# Patient Record
Sex: MALE | Race: WHITE | NOT HISPANIC OR LATINO | Employment: OTHER | URBAN - METROPOLITAN AREA
[De-identification: names, ages, dates, MRNs, and addresses within clinical notes are randomized per-mention and may not be internally consistent; named-entity substitution may affect disease eponyms.]

---

## 2020-01-22 ENCOUNTER — OFFICE VISIT (OUTPATIENT)
Dept: PODIATRY | Facility: CLINIC | Age: 60
End: 2020-01-22
Payer: COMMERCIAL

## 2020-01-22 VITALS
BODY MASS INDEX: 31.14 KG/M2 | DIASTOLIC BLOOD PRESSURE: 70 MMHG | HEIGHT: 73 IN | WEIGHT: 235 LBS | SYSTOLIC BLOOD PRESSURE: 116 MMHG

## 2020-01-22 DIAGNOSIS — M79.89 SOFT TISSUE MASS: Primary | ICD-10-CM

## 2020-01-22 DIAGNOSIS — M79.671 RIGHT FOOT PAIN: ICD-10-CM

## 2020-01-22 PROCEDURE — 99203 OFFICE O/P NEW LOW 30 MIN: CPT | Performed by: PODIATRIST

## 2020-01-22 ASSESSMENT — MIFFLIN-ST. JEOR: SCORE: 1924.83

## 2020-01-22 NOTE — PATIENT INSTRUCTIONS
Thank you for choosing [facility] Podiat[facility] / Foot & Ankle Surgery!    DR. CAO'S CLINIC LOCATIONS     MONDAY - [place] WEDNESDAY (AM ONLY) - [place]   [address] [place] [address] [address] [address]   [phone] / [phone] / [phone]       THURSDAY - [place] SCHEDULE SURGERY: [phone]   [address] APPOINTMENTS: [phone]   [place] [address] BILLING QUESTIONS: [phone] / [phone]         Follow up: 6 weeks          FYI: BODY WEIGHT AND YOUR FEET  The following information is included in the after visit summary for all patients. Body weight can be a sensitive issue to discuss in clinic, but we think the following information is very important. Although we focus on the feet and ankles, we do support the overall health of our patients.     Many things can cause foot and ankle problems. Foot structure, activity level, foot mechanics and injuries are common causes of pain. One very important issue that often goes unmentioned, is body weight. Extra weight can cause increased stress on muscles, ligaments, bones and tendons. Sometimes just a few extra pounds is all it takes to put one over her/his threshold. Without reducing that stress, it can be difficult to alleviate pain. As Foot & Ankle specialists, our job is addressing the lower extremity problem and possible causes. Regarding extra body weight, we encourage patients to discuss diet and weight management plans with their primary care doctors. It is this team approach that gives you the best opportunity for pain relief and getting you back on your feet.      [facility] has a Comprehensive Weight Management Program. This program includes counseling, education, non-surgical and surgical approaches to weight loss. If you are interested in learning more either talk to you primary care provider or call [phone].

## 2020-01-22 NOTE — PROGRESS NOTES
ASSESSMENT/PLAN:    Encounter Diagnoses   Name Primary?     Soft tissue mass Yes     Right foot pain      Since the lesion causes pain and we don't know what it is, I recommend excision and biopsy.  He said that there was an attempt at aspiration that was not successful. Although this can be multiple things, I suspect an epidermal inclusion cyst.    He will consider this.  For more immediate pain relief, I recommended stiffer soled shoes and considering a cushioned insert with aperture cut below the area. Low impact exercise is best at this time.    We discussed the surgical procedure and expected length of recovery.   The patient was asked to carefully consider surgery.  If it is scheduled, he will need to return for a more detailed discussion including the planned procedure(s), risks, benefits, post operative cares, course and prognosis.      Body mass index is 31 kg/m .    Weight management plan: Patient was referred to their PCP to discuss a diet and exercise plan.      Danie Raza DPM, FACFAS, MS    [facility] of Pod[facility]/Foot & Ankle Surgery      ____________________________________________________________________    HPI:         Chief Complaint: lump on ball of right foot  Onset of problem: 9 months  Pain/ discomfort is described as:  Ache when I step  Ratin/10   Frequency:  daily    The pain is made worse with hiking, walking  Previous treatment: [facility]; MRI was done    Secondary concerns:  Left foot numbness related to a previously diagnosed back problem.  He has seen a specialist for this.     *There is no problem list on file for this patient.  *  *No past surgical history on file.*  *  No current outpatient medications on file.       ROS:     A 10-point review of systems was performed and is positive for that noted above in the HPI and as seen below.  All other areas are negative.     Numbness in feet?  no   Calf pain with walking? no  Recent foot/ankle injury? no  Weight  "change over past 12 months? no  Self perception as overweight? yes  Recent flu-like symptoms? no  Joint pain other than feet ? back    Social History: Employment:  retired;  Exercise/Physical activity:  daily;  Tobacco use:  no  Social History     Socioeconomic History     Marital status:      Spouse name: Not on file     Number of children: Not on file     Years of education: Not on file     Highest education level: Not on file   Occupational History     Not on file   Social Needs     Financial resource strain: Not on file     Food insecurity:     Worry: Not on file     Inability: Not on file     Transportation needs:     Medical: Not on file     Non-medical: Not on file   Tobacco Use     Smoking status: Never Smoker     Smokeless tobacco: Never Used   Substance and Sexual Activity     Alcohol use: Not on file     Drug use: Not on file     Sexual activity: Not on file   Lifestyle     Physical activity:     Days per week: Not on file     Minutes per session: Not on file     Stress: Not on file   Relationships     Social connections:     Talks on phone: Not on file     Gets together: Not on file     Attends Mu-ism service: Not on file     Active member of club or organization: Not on file     Attends meetings of clubs or organizations: Not on file     Relationship status: Not on file     Intimate partner violence:     Fear of current or ex partner: Not on file     Emotionally abused: Not on file     Physically abused: Not on file     Forced sexual activity: Not on file   Other Topics Concern     Not on file   Social History Narrative     Not on file       Family history:  No family history on file.    Rheumatoid arthritis:  no  Foot Problems: no  Diabetes: no      EXAM:    Vitals: /70   Ht 1.854 m (6' 1\")   Wt 106.6 kg (235 lb)   BMI 31.00 kg/m    BMI: Body mass index is 31 kg/m .  Height: 6' 1\"    Constitutional/ general:  Pt is in no apparent distress, appears well-nourished.  Cooperative with " history and physical exam.     Vascular:  Pedal pulses are palpable bilaterally for both the DP and PT arteries.  CFT < 3 sec.  No edema.  Pedal hair growth noted.     Neuro:  Alert and oriented x 3. Coordinated gait.  Light touch sensation is intact to the L4, L5, S1 distributions. No obvious deficits.  No evidence of neurological-based weakness, spasticity, or contracture in the lower extremities.     Derm: Normal texture and turgor.  No erythema, ecchymosis, or cyanosis.  No open lesions. Hyperkeratotic lesion sub right first metatarsal head region with a palpable underlying/ subcutaneous mass.     Musculoskeletal:    Lower extremity muscle strength is normal.  Patient is ambulatory without an assistive device or brace .  No gross deformities.      MRI results found on Care Everywhere:    Suspected proteinaceous/ hemorrhagic adventitial bursa or cyst

## 2020-01-22 NOTE — PROGRESS NOTES
MRI 9/16/19  IMPRESSION:  1.  Nonspecific ovoid 1.6 cm lesion along the plantar surface of the forefoot at  the level of the first MTP joint. There is no definitive enhancement, suggesting  that this lesion is a proteinaceous/hemorrhagic adventitial bursa or cyst.  Infection is difficult to exclude entirely based on the imaging findings.  Correlation is needed. Consider fluid/tissue sampling for more definitive  characterization.    2.  Degenerative changes of the first MTP and third TMT joint.

## 2020-02-26 ENCOUNTER — OFFICE VISIT (OUTPATIENT)
Dept: PEDIATRICS | Facility: CLINIC | Age: 60
End: 2020-02-26
Payer: COMMERCIAL

## 2020-02-26 ENCOUNTER — MYC MEDICAL ADVICE (OUTPATIENT)
Dept: PEDIATRICS | Facility: CLINIC | Age: 60
End: 2020-02-26

## 2020-02-26 ENCOUNTER — TELEPHONE (OUTPATIENT)
Dept: DERMATOLOGY | Facility: CLINIC | Age: 60
End: 2020-02-26

## 2020-02-26 VITALS
WEIGHT: 236.8 LBS | BODY MASS INDEX: 32.07 KG/M2 | RESPIRATION RATE: 16 BRPM | SYSTOLIC BLOOD PRESSURE: 116 MMHG | HEART RATE: 80 BPM | HEIGHT: 72 IN | OXYGEN SATURATION: 98 % | DIASTOLIC BLOOD PRESSURE: 78 MMHG | TEMPERATURE: 97.3 F

## 2020-02-26 DIAGNOSIS — Z12.5 SCREENING FOR PROSTATE CANCER: ICD-10-CM

## 2020-02-26 DIAGNOSIS — L57.0 ACTINIC KERATOSIS: Primary | ICD-10-CM

## 2020-02-26 DIAGNOSIS — Z00.00 ROUTINE GENERAL MEDICAL EXAMINATION AT A HEALTH CARE FACILITY: Primary | ICD-10-CM

## 2020-02-26 DIAGNOSIS — R20.0 NUMBNESS OF LEFT FOOT: ICD-10-CM

## 2020-02-26 DIAGNOSIS — L57.0 ACTINIC KERATOSIS: ICD-10-CM

## 2020-02-26 DIAGNOSIS — M51.27 HERNIATION OF INTERVERTEBRAL DISC BETWEEN L5 AND S1: ICD-10-CM

## 2020-02-26 DIAGNOSIS — Z12.11 SCREEN FOR COLON CANCER: ICD-10-CM

## 2020-02-26 LAB
ANION GAP SERPL CALCULATED.3IONS-SCNC: 3 MMOL/L (ref 3–14)
BUN SERPL-MCNC: 19 MG/DL (ref 7–30)
CALCIUM SERPL-MCNC: 8.6 MG/DL (ref 8.5–10.1)
CHLORIDE SERPL-SCNC: 109 MMOL/L (ref 94–109)
CHOLEST SERPL-MCNC: 172 MG/DL
CO2 SERPL-SCNC: 27 MMOL/L (ref 20–32)
CREAT SERPL-MCNC: 1.18 MG/DL (ref 0.66–1.25)
GFR SERPL CREATININE-BSD FRML MDRD: 66 ML/MIN/{1.73_M2}
GLUCOSE SERPL-MCNC: 99 MG/DL (ref 70–99)
HCV AB SERPL QL IA: NONREACTIVE
HDLC SERPL-MCNC: 62 MG/DL
HIV 1+2 AB+HIV1 P24 AG SERPL QL IA: NONREACTIVE
LDLC SERPL CALC-MCNC: 104 MG/DL
NONHDLC SERPL-MCNC: 110 MG/DL
POTASSIUM SERPL-SCNC: 4.4 MMOL/L (ref 3.4–5.3)
PSA SERPL-ACNC: 1.9 UG/L (ref 0–4)
SODIUM SERPL-SCNC: 139 MMOL/L (ref 133–144)
TRIGL SERPL-MCNC: 32 MG/DL

## 2020-02-26 PROCEDURE — 99386 PREV VISIT NEW AGE 40-64: CPT | Performed by: NURSE PRACTITIONER

## 2020-02-26 PROCEDURE — G0103 PSA SCREENING: HCPCS | Performed by: NURSE PRACTITIONER

## 2020-02-26 PROCEDURE — 86803 HEPATITIS C AB TEST: CPT | Performed by: NURSE PRACTITIONER

## 2020-02-26 PROCEDURE — 80048 BASIC METABOLIC PNL TOTAL CA: CPT | Performed by: NURSE PRACTITIONER

## 2020-02-26 PROCEDURE — 87389 HIV-1 AG W/HIV-1&-2 AB AG IA: CPT | Performed by: NURSE PRACTITIONER

## 2020-02-26 PROCEDURE — 99203 OFFICE O/P NEW LOW 30 MIN: CPT | Mod: 25 | Performed by: NURSE PRACTITIONER

## 2020-02-26 PROCEDURE — 36415 COLL VENOUS BLD VENIPUNCTURE: CPT | Performed by: NURSE PRACTITIONER

## 2020-02-26 PROCEDURE — 80061 LIPID PANEL: CPT | Performed by: NURSE PRACTITIONER

## 2020-02-26 ASSESSMENT — MIFFLIN-ST. JEOR: SCORE: 1917.12

## 2020-02-26 NOTE — TELEPHONE ENCOUNTER
General Call:   Who is calling:  Patient  Reason for Call:  Appointment  What are your questions or concerns:  Patient saw Annalisa Nelson 2/26 and was referred to derm because he has a spot on his right temple. Patient stated he had previously had the spot frozen off by a dermatologist but it has come back. It doesn't look like Dr. Ortiz has any New Pt spots available and her schedule isn't open yet for June. Patient is hoping she can fit him in sometime sooner.  Date of last appointment with provider: 2/26  Okay to leave a detailed message:Yes at Cell number on file:    Telephone Information:   Mobile [phone]        Kirsty Milton,

## 2020-02-26 NOTE — PROGRESS NOTES
"SUBJECTIVE:   CC: Jeremy Barnett is an 59 year old male who presents for preventative health visit.     Healthy Habits:     Getting at least 3 servings of Calcium per day:  Yes    Bi-annual eye exam:  Yes    Dental care twice a year:  Yes    Sleep apnea or symptoms of sleep apnea:  None    Diet:  Regular (no restrictions)    Frequency of exercise:  6-7 days/week    Duration of exercise:  45-60 minutes    Taking medications regularly:  Not Applicable    Barriers to taking medications:  Not applicable    Medication side effects:  Not applicable    PHQ-2 Total Score: 0    Additional concerns today:  Yes        He was previously following with [facility] and unhappy with the care he received there so he changed insurance and is transferring care to [place]  He retired Dec 31, 2018  Longstanding history of low back pain  In October of last year developed numbness of left foot from below knee to toes  Saw spine specialist at [facility]  Had MRI at [place]  \"1. Left central disc extrusion at L5-S1 contacts and displaces the   traversing left S1 nerve root within the lateral recess.  2. Mild degenerative change at L4-L5.\"    [facility] 5-6 [facility] of [facility] which was not effective. He later found out they did it in the wrong place  He continues to have numbness of left food, sometimes laterally, sometimes anteriorly  He continues to do home PHYSICAL THERAPY exercises  Back pain has resolved, but numbness and weakness remains    He had a lesion on face frozen last year by dermatology and it has returned  Told it was \"pre-cancerous\"    Enjoys hiking. Used to hike 5-6 miles every morning and this is difficult given foot weakness/numbness        Today's PHQ-2 Score:   PHQ-2 ( 1999 [employer]) 2/23/2020   Q1: Little interest or pleasure in doing things 0   Q2: Feeling down, depressed or hopeless 0   PHQ-2 Score 0   Q1: Little interest or pleasure in doing things Not at all   Q2: Feeling down, depressed or " hopeless Not at all   PHQ-2 Score 0       Abuse: Current or Past(Physical, Sexual or Emotional)- No  Do you feel safe in your environment? Yes    Have you ever done Advance Care Planning? (For example, a Health Directive, POLST, or a discussion with a medical provider or your loved ones about your wishes): YES-Instructed to bring copy in to get scanned    Social History     Tobacco Use     Smoking status: Never Smoker     Smokeless tobacco: Never Used   Substance Use Topics     Alcohol use: Not on file       No flowsheet data found.    Last PSA: No results found for: PSA    Reviewed orders with patient. Reviewed health maintenance and updated orders accordingly - Yes  Lab work is in process  Labs reviewed in EPIC  BP Readings from Last 3 Encounters:   20 116/78   20 116/70    Wt Readings from Last 3 Encounters:   20 107.4 kg (236 lb 12.8 oz)   20 106.6 kg (235 lb)                  There is no problem list on file for this patient.    History reviewed. No pertinent surgical history.    Social History     Tobacco Use     Smoking status: Never Smoker     Smokeless tobacco: Never Used   Substance Use Topics     Alcohol use: Never     Family History   Problem Relation Age of Onset     Other Cancer Mother         pancreatic cancer  at 86 years old     Other Cancer Sister         Breast cancer - treated at age 62 and recovered     Coronary Artery Disease Father         s/p stent, low EF, not CHF           Reviewed and updated as needed this visit by clinical staff         Reviewed and updated as needed this visit by Provider        History reviewed. No pertinent past medical history.     Tablet not Done    Review of Systems  CONSTITUTIONAL: NEGATIVE for fever, chills, change in weight  INTEGUMENTARY/SKIN: NEGATIVE for worrisome rashes, moles or lesions  EYES: NEGATIVE for vision changes or irritation  ENT: NEGATIVE for ear, mouth and throat problems  RESP: NEGATIVE for significant cough or  SOB  CV: NEGATIVE for chest pain, palpitations or peripheral edema  GI: NEGATIVE for nausea, abdominal pain, heartburn, or change in bowel habits   male: negative for dysuria, hematuria, decreased urinary stream, erectile dysfunction, urethral discharge  MUSCULOSKELETAL: POSITIVE as above  NEURO: POSITIVE as above  PSYCHIATRIC: NEGATIVE for changes in mood or affect    OBJECTIVE:   /78 (BP Location: Right arm, Patient Position: Chair, Cuff Size: Adult Large)   Pulse 80   Temp 97.3  F (36.3  C) (Oral)   Resp 16   Ht 1.829 m (6')   Wt 107.4 kg (236 lb 12.8 oz)   SpO2 98%   BMI 32.12 kg/m      Physical Exam  GENERAL: healthy, alert and no distress  EYES: Eyes grossly normal to inspection, PERRL and conjunctivae and sclerae normal  HENT: ear canals and TM's normal, nose and mouth without ulcers or lesions  NECK: no adenopathy, no asymmetry, masses, or scars and thyroid normal to palpation  RESP: lungs clear to auscultation - no rales, rhonchi or wheezes  CV: regular rate and rhythm, normal S1 S2, no S3 or S4, no murmur, click or rub, no peripheral edema and peripheral pulses strong  ABDOMEN: soft, nontender, no hepatosplenomegaly, no masses and bowel sounds normal  MS: Gait intact, unable to toe walk on left d/t weakness, strength 4/5  SKIN: Scaly lesion above right eye brow consistent with AK  NEURO: Altered sensation left foot  PSYCH: mentation appears normal, affect normal/bright    Diagnostic Test Results:  Labs reviewed in Epic    ASSESSMENT/PLAN:   1. Routine general medical examination at a health care facility  - Hepatitis C Screen Reflex to HCV RNA Quant and Genotype  - Lipid panel reflex to direct LDL Fasting  - Basic metabolic panel  - HIV Antigen Antibody Combo    2. Screen for colon cancer  - GASTROENTEROLOGY ADULT REF PROCEDURE ONLY [facility] [phone]; No Provider Preference    3. Actinic keratosis  - DERMATOLOGY REFERRAL    4. Herniation of intervertebral disc between L5 and  "S1  - Recommend referral to medical spine within [place]. Recommend getting CD of previous imaging to bring to appointemnt  - SPINE CARE REFERRAL    5. Numbness of left foot  - as above  - SPINE CARE REFERRAL    6. Screening for prostate cancer  - PSA, screen    COUNSELING:   Reviewed preventive health counseling, as reflected in patient instructions       Regular exercise       Healthy diet/nutrition       Immunizations    Declined for Shingrix - will check with insurance on cost           Consider Hep C screening for patients born between 1945 and 1965       HIV screeninx in teen years, 1x in adult years, and at intervals if high risk       Colon cancer screening       Prostate cancer screening    Estimated body mass index is 31 kg/m  as calculated from the following:    Height as of 20: 1.854 m (6' 1\").    Weight as of 20: 106.6 kg (235 lb).          reports that he has never smoked. He has never used smokeless tobacco.      Counseling Resources:  ATP IV Guidelines  Pooled Cohorts Equation Calculator  FRAX Risk Assessment  ICSI Preventive Guidelines  Dietary Guidelines for Americans, 2010  USDA's MyPlate  ASA Prophylaxis  Lung CA Screening    ESTRELLA Fisher CNP  [facility] [place]  "

## 2020-02-27 NOTE — RESULT ENCOUNTER NOTE
Jeremy Barnett,    Your lab results have been released to [facility].   Your labs look good. Cholesterol is great! HIV and hepatitis C screening is negative.   Please call the clinic if you have any concerns [phone]    ESTRELLA Fisher [facility]

## 2020-02-28 ENCOUNTER — OFFICE VISIT (OUTPATIENT)
Dept: PALLIATIVE MEDICINE | Facility: CLINIC | Age: 60
End: 2020-02-28
Payer: COMMERCIAL

## 2020-02-28 VITALS — SYSTOLIC BLOOD PRESSURE: 136 MMHG | DIASTOLIC BLOOD PRESSURE: 87 MMHG | OXYGEN SATURATION: 99 % | HEART RATE: 71 BPM

## 2020-02-28 DIAGNOSIS — M54.16 LUMBAR RADICULOPATHY: Primary | ICD-10-CM

## 2020-02-28 PROCEDURE — 99203 OFFICE O/P NEW LOW 30 MIN: CPT | Performed by: PHYSICAL MEDICINE & REHABILITATION

## 2020-02-28 ASSESSMENT — PAIN SCALES - GENERAL: PAINLEVEL: SEVERE PAIN (6)

## 2020-02-28 NOTE — TELEPHONE ENCOUNTER
Called and spoke with patient regarding message. He advised he spoke with Annalisa Nelson and she advised him about [facility] [place]. He has appointment scheduled at the end of March and is requesting not to schedule at this time. Kirsty Simon MA

## 2020-02-28 NOTE — PROGRESS NOTES
[facility] Consultation    Date of visit: 2/28/2020    Primary Care Provider: Dr. Annalisa Nelson    Reason for consultation:    Jeremy Barnett is a 59 year old male who is seen in consultation today at the request of his primary care physician, Annalisa Nelson.     Consultation and Evaluation for: Medical spine evaluation. He is accompanied to clinic by his wife.     Review of [place] Prescription Monitoring Program (): Today I have also reviewed the patient's history of controlled substance use, as provided by [place] licensed pharmacies and prescriber dispensers.     Review of Pain Questionnaire: Please see the [facility] health questionnaire, which the patient completed and reviewed with me in detail.    Review of Electronic Chart: Today I have also reviewed available medical information in the patient's medical record at [facility] ([facility]), including relevant provider notes, laboratory work, and imaging.     Chief Complaint:    Paresthesia and weakness left foot    Pain history:  Jeremy Barnett is a 59 year old male with no significant past medical history who first started having problems with back pain many years ago which would be off and on. In October 2019 he had recurrence of back pain mainly in the left side which improved after 2-3 days. Then he was mowing the lawn and couple days after had recurrence of back pain and also radiation into the left leg. He was seen at [facility] for these issues. He had an MRI which showed a disc extrusion at L5-S1 which contacted and displaced the traversing left S1 nerve root. He tried conservative management with PT 5-6 sessions and continues to do his HEP. Had a left L5-S1 Transforaminal epidural steroid injection at [facility]. States that by the time he had the injection his back and leg pain had resolved. The only symptom that remained was paresthesias in the leg distal to the knee. The  injection was not helpful. Today he reports that he is not having pain. His main concern is ongoing weakness in the foot. He also continues to have paresthesias but this is not what really bothers him. The weakness has been present since the  of  and has not progressed. No recent changes. He notes weakness with lifting the heel off the ground on the left.     Denies red flags including: bowel or bladder symptoms, fever, chills, saddle anesthesia, history of cancer, history of immune compromise, weight loss.     Current medication treatments include:  None    Pain medications are being prescribed by NA.    Previous medication treatments included:  Prednisone     Other treatments have included:  Behavioral interventions: None  PT: Yes - [facility]  Manual Medicine: None  Surgeries: None  Acupuncture: None  TENs Unit: None  Injections: Left L5-S1 Transforaminal epidural steroid injection - [facility]    Past Medical History:  Reviewed and non-contributory    Past Surgical History:  None    Medications:  Current Outpatient Medications   Medication Sig Dispense Refill     cetirizine (ZYRTEC) 10 MG tablet Take 10 mg by mouth daily       Allergies:   No Known Allergies    Social History:  Home situation: Lives in [place], [place] with his wife.   Occupation/Schooling: Retired. Keeps active.   Tobacco use: None  Alcohol use: None  Drug use: None    Family history:  Family History   Problem Relation Age of Onset     Other Cancer Mother         pancreatic cancer  at 86 years old     Other Cancer Sister         Breast cancer - treated at age 62 and recovered     Coronary Artery Disease Father         s/p stent, low EF, not CHF     Diagnostic Tests:  Lumbar MRI completed on 10/30/2019 showed:  FINDINGS:   Alignment is significant for slight levoconvex curvature. Bone marrow   demonstrates mild edema surrounding L5-S1 disc joints and mild fatty   endplate change surrounding the L4-L5 disc joint. Conus medullaris   and cauda equina are  without appreciable abnormality. Paraspinal soft   tissues are unremarkable.     T12-L1: Normal disc height and signal. No herniation. Normal facets.   No spinal canal or neural foraminal stenosis.      L1-L2: Normal disc height and signal. No herniation. Normal facets.   No spinal canal or neural foraminal stenosis.     L2-L3: Normal disc height and signal. No herniation. Normal facets.   No spinal canal or neural foraminal stenosis.      L3-L4: Normal disc height and signal. No herniation. Normal facets.   No spinal canal or neural foraminal stenosis.     L4-L5: Mild disc height loss. Disc bulge, asymmetric to the left.   Mild bilateral facet adenopathy. Mild bilateral foraminal stenosis.   Mild spinal canal stenosis with slight asymmetric narrowing of the   left lateral recess.     L5-S1: Mild disc height loss. Disc bulge with superimposed left   central disc extrusion measuring approximately 0.9 x 1.8 x 2.2 cm (AP   x TR x SI). The extrusion contacts and displaces the traversing left   S1 nerve root within the lateral recess (series 6 image 28, series 3   image 8). Mild bilateral foraminal stenosis, right worse than left.   Mild spinal canal stenosis.     IMPRESSION:  1. Left central disc extrusion at L5-S1 contacts and displaces the   traversing left S1 nerve root within the lateral recess.  2. Mild degenerative change at L4-L5.    Review of Systems:    POSTIVE IN BOLD  GENERAL: fever/chills, fatigue, general unwell feeling, weight gain/loss.  HEAD/EYES:  headache, dizziness, or vision changes.    EARS/NOSE/THROAT:  Nosebleeds, hearing loss, sinus infection, earache, tinnitus.  IMMUNE:  Allergies, cancer, immune deficiency, or infections.  SKIN:  Urticaria, rash, hives  HEME/Lymphatic:   anemia, easy bruising, easy bleeding.  RESPIRATORY:  cough, wheezing, or shortness of breath  CARDIOVASCULAR/Circulation:  Extremity edema, syncope, hypertension, tachycardia, or angina.  GASTROINTESTINAL:  abdominal pain,  nausea/emesis, diarrhea, constipation,  hematochezia, or melena.  ENDOCRINE:  Diabetes, steroid use,  thyroid disease or osteoporosis.  MUSCULOSKELETAL: neck pain, back pain, arthralgia, arthritis, or gout.  GENITOURINARY:  frequency, urgency, dysuria, difficulty voiding, hematuria or incontinence.  NEUROLOGIC:  weakness, numbness, paresthesias, seizure, tremor, stroke or memory loss.  PSYCHIATRIC:  depression, anxiety, stress, suicidal thoughts or mood swings.     Physical Exam:  Vitals:    02/28/20 1414   BP: 136/87   Pulse: 71   SpO2: 99%     Exam:  Constitutional: healthy, alert, active and no distress  Head: normocephalic. Atraumatic.   Eyes: no redness or jaundice noted   ENT: oropharnx normal.  MMM.  Neck supple.    Respiratory: breathing unlabored on room air  Gastrointestinal: soft, non-tender  : deferred  Skin: no suspicious lesions or rashes  Psychiatric: mentation appears normal and affect normal/bright    Musculoskeletal exam:  Gait/Station/Posture: Normal  Thoracic spine:  Normal   Lumbar spine: ROM wnl  Myofascial tenderness:  No significant myofascial tenderness of lumbar paraspinals  Straight leg exam: negative b/l     Bilateral hip motion without discomfort     Neurologic exam:  Motor:  5/5 LE strength, except for left ankle dorsiflexion which is 4/5 with manual muscle testing. He has difficulty with single leg heel raise on the left.    Reflexes:     Patella:  R:  2/4 L: 2/4   Achilles:  R:  2/4 L: 0/4     Sensory:  (lower extremities):   Light touch: normal    Allodynia: absent    Hyperalgesia: absent     Assessment:  Jeremy Barnett is a 59 year old male with no significant past medical history who presents with complaints of paresthesia and weakness in left lower extremity which has been ongoing since October 2019. MRI of lumbar spine shows a disc extrusion at L5-S1 which contacted and displaced the traversing left S1 nerve root. Pain has resolved but he is having ongoing paresthesia and  weakness with left ankle dorsiflexion. Exam is also notable for 0/4 reflex at left achilles and 4/5 strength with ankle dorsiflexion. Etiology of his symptoms is consistent with a left S1 radiculopathy.     Plan:  Diagnosis reviewed, treatment options addressed, and risks/benefits discussed. The patient was involved in shared decision making regarding the plan as laid out below.    1. Neurosurgery referral placed given weakness and reflex changes on exam.   2. Discussed that he can f/u with me if surgery is not recommended to help manage symptoms.     Lise Loyola MD  [facility]      Total time spent face to face was 40 minutes and more than 50% of face to face time was spent in counseling and/or coordination of care regarding the diagnosis and recommendations above.

## 2020-02-28 NOTE — PATIENT INSTRUCTIONS
Thank you for coming to [facility] for your care. It is my goal to partner with you to help you reach your optimal state of health.     You were seen today for your back pain. As discussed during your visit these are the recommendations:    1. Recommend a referral to Neurosurgery.  2. You can follow up with me if no surgery is recommended and we can discuss other options that could be helpful.   ---------------------------------------------------------------  Clinic Number:  [phone]     Call with any questions about your care and for scheduling assistance.     Calls are returned Monday through Friday between 8 AM and 4:30 PM. We usually get back to you within 2 business days depending on the issue/request.    If we are prescribing your medications:    For opioid medication refills, call the clinic or send a [employer] message 7 days in advance.  Please include:    Name of requested medication    Name of the pharmacy.    For non-opioid medications, call your pharmacy directly to request a refill. Please allow 3-4 days to be processed.     Per [place] State Law:    All controlled substance prescriptions must be filled within 30 days of being written.      For those controlled substances allowing refills, pickup must occur within 30 days of last fill.      We believe regular attendance is key to your success in our program!      Any time you are unable to keep your appointment we ask that you call us at least 24 hours in advance to cancel.This will allow us to offer the appointment time to another patient.     Multiple missed appointments may lead to dismissal from the clinic.

## 2020-03-02 ENCOUNTER — OFFICE VISIT (OUTPATIENT)
Dept: NEUROSURGERY | Facility: CLINIC | Age: 60
End: 2020-03-02
Attending: PHYSICIAN ASSISTANT
Payer: COMMERCIAL

## 2020-03-02 VITALS
WEIGHT: 240 LBS | HEIGHT: 72 IN | DIASTOLIC BLOOD PRESSURE: 82 MMHG | HEART RATE: 78 BPM | BODY MASS INDEX: 32.51 KG/M2 | OXYGEN SATURATION: 99 % | RESPIRATION RATE: 18 BRPM | SYSTOLIC BLOOD PRESSURE: 139 MMHG

## 2020-03-02 DIAGNOSIS — M54.16 LUMBAR RADICULOPATHY: ICD-10-CM

## 2020-03-02 PROCEDURE — 99204 OFFICE O/P NEW MOD 45 MIN: CPT | Performed by: PHYSICIAN ASSISTANT

## 2020-03-02 PROCEDURE — G0463 HOSPITAL OUTPT CLINIC VISIT: HCPCS

## 2020-03-02 RX ORDER — CETIRIZINE HYDROCHLORIDE 10 MG/1
10 TABLET ORAL DAILY
Status: ON HOLD | COMMUNITY
End: 2020-03-16

## 2020-03-02 ASSESSMENT — PAIN SCALES - GENERAL: PAINLEVEL: NO PAIN (0)

## 2020-03-02 ASSESSMENT — MIFFLIN-ST. JEOR: SCORE: 1931.63

## 2020-03-02 NOTE — NURSING NOTE
Jeremy Barnett is a 59 year old male who presents for:  Chief Complaint   Patient presents with     Consult For     Lumbar        Initial Vitals:  /82   Pulse 78   Resp 18   Ht 6' (1.829 m)   Wt 240 lb (108.9 kg)   SpO2 99%   BMI 32.55 kg/m   Estimated body mass index is 32.55 kg/m  as calculated from the following:    Height as of this encounter: 6' (1.829 m).    Weight as of this encounter: 240 lb (108.9 kg).. Body surface area is 2.35 meters squared. BP completed using cuff size: regular  No Pain (0)    Nursing Comments: Pt present today for consult of Lumbar.    Vadim Gonsalez, CMA

## 2020-03-02 NOTE — LETTER
3/2/2020         RE: Jeremy Barnett  [address] [place] [address]        Dear Colleague,    Thank you for referring your patient, Jeremy Barnett, to the [facility]. Please see a copy of my visit note below.    NEUROSURGERY CLINIC CONSULT NOTE     DATE OF VISIT: 3/2/2020     SUBJECTIVE:     Jeremy Barnett is a pleasant 59 year old male who presents to the clinic today for consultation on left lower leg and foot numbness and weakness. He is referred to the Neurosurgery Clinic by Dr. Loyola of [facility].   Today, Mr. Barnett reports a 5-month history of symptoms. He describes constant numbness from his left knee to his foot, mainly on the lateral aspect. He is also experiencing left plantar flexion weakness. He is no longer experiencing any pain what-so-ever. No alleviating or agravating symptoms as he is only experiencing constant numbness and weakness. No mechanism of injury such as trauma or a fall is associated with the onset of the pain. There are no bowel or bladder changes. The patient has participated in conservative therapies to include physical therapy, traction, contrast baths and a left L5-S1 TF epidural steroid injection administered on 02/28/2020. None of these modalities have provided any significant long term relief.       Current Outpatient Medications:      cetirizine (ZYRTEC) 10 MG tablet, Take 10 mg by mouth daily, Disp: , Rfl:      No Known Allergies     History reviewed. No pertinent past medical history.     ROS: 10 point review of symptoms are negative other than the symptoms noted above in the HPI.     Family History has been reviewed with the patient, there are no pertinent findings to presenting concern.     History reviewed. No pertinent surgical history.     Social History     Tobacco Use     Smoking status: Never Smoker     Smokeless tobacco: Never Used   Substance Use Topics     Alcohol use: Never     Drug use: Never        OBJECTIVE:   /82   Pulse 78    Resp 18   Ht 6' (1.829 m)   Wt 240 lb (108.9 kg)   SpO2 99%   BMI 32.55 kg/m      Body mass index is 32.55 kg/m .     Imaging:     Lumbar MRI  - [facility]    Findings, per radiologist read, notable for:      L5-S1 HNP     Full radiological report in chart. Imaging reviewed with with patient today.     Exam:     Patient appears comfortable, conversational, and in no apparent distress.   Head: Normocephalic, without obvious abnormality, atraumatic, no facial asymmetry.   Eyes: conjunctivae/corneas clear. PERRL, EOM's intact.   Throat: lips, mucosa, and tongue normal; teeth and gums normal.   Neck: supple, symmetrical, trachea midline, no adenopathy and thyroid: not enlarged, symmetric, no tenderness/mass/nodules.   Lungs: clear to auscultation bilaterally.   Heart: regular rate and rhythm.   Abdomen: soft, non-tender; bowel sounds normal; no masses, no organomegaly.   Pulses: 2+ and symmetric.   Skin: Skin color, texture, turgor normal. No rashes or lesions.     CN II-XII grossly intact, alert and appropriate with conversation and following commands.   Gait is non-antalgic. Able to tandem walk. Unable to walk on left toes without difficulty.    Cervical spine is non tender to palpation. Appropriate range of motion of neck, not concerning for lhermitte's phenomenon.   Bilateral bicep 2/4 and tricep reflexes 1/4. Sensation intact throughout upper extremities.     UE muscle strength  Right  Left    Deltoid  5/5  5/5    Biceps  5/5  5/5    Triceps  5/5  5/5    Hand intrinsics  5/5  5/5    Hand grasp  5/5  5/5    Juan signs  neg  neg      Lumbar spine is non tender to palpation   Intact diminished throughout left lower extremities.   Bilateral patellar 2/4 and achilles reflex 1/4. Negative for pain with straight leg raise.     LE muscle strength  Right  Left    Iliopsoas (hip flexion)  5/5  5/5    Quad (knee extension)  5/5  5/5    Hamstring (knee flexion)  5/5  5/5    Gastrocnemius (PF)  5/5  4/5     Tibialis Ant. (DF)  5/5  5/5    EHL  5/5  5/5      Negative Babinski bilaterally. Negative for clonus.   Calves are soft and non-tender bilaterally.     ASSESSMENT/PLAN:     Jeremy Barnett is a 59 year old male who presents to the clinic for consultation on  left lower leg and foot numbness and weakness, there is no pain. The patient's most recent imaging was reviewed with he and his wife today. It was explained that images show a L5-S1 HNP, which correlates to today's clinical examination. On exam, the patient is noted to have left plantar flexion weakness and diminished sensation from the knee to the foot. The patient has attempted conservative management with physical therapy, traction, contrast baths and a left L5-S1 TF epidural steroid injection, without resolution of symptoms.     He has failed conservative management, and therefore, we feel that he would be best amendable to surgical intervention. We discussed surgical options that included a left L5-S1 hemilaminectomy microdiscectomy. We also discussed continuing with non-surgical options, although the risk of permanent nerve damage is greater with continued delay of relieving the pressure from the nerve due to the stenosis.   We reviewed the surgical risks including nerve damage, infection, bleeding, residual or recurrent disk / symptoms and spinal fluid leak. This will also be performed utilizing general anesthesia which can pose both cardiovascular and respiratory risks as well.     We described the procedure as being a minimally invasive same day surgery to be performed at [facility]. We also discussed the normal postoperative recovery that would include not lifting anything greater than 5-10 pounds, avoid excessive bending, twisting, and turning and to make frequent position changes about every 30 minutes going from sitting, standing and walking for six weeks approximately. We also discussed the timing of post-operative follow-up appointments in  the Neurosurgery Clinic.     We did review the images together and explained his condition and the recommended treatment. We also discussed signs of a worsening problem that he should seek being evaluated.   He appeared to have a good understanding of the situation, asked appropriate questions which we answered, and wishes to discuss this with his wife prior to proceeding with surgical intervention.     He will call the clinic with any decision he has made. Our cards with contact information  were provided  In the event that patient's symptoms worsen or change we would like to see him sooner. We also discussed signs of a worsening problem that he should seek being evaluated.        Respectfully,     ANGE Galicia PA-C Dr. Hunt met with the patient today and reviewed the above plan.       Again, thank you for allowing me to participate in the care of your patient.        Sincerely,        Reji Sharma PA-C

## 2020-03-02 NOTE — PROGRESS NOTES
NEUROSURGERY CLINIC CONSULT NOTE     DATE OF VISIT: 3/2/2020     SUBJECTIVE:     Jeremy Barnett is a pleasant 59 year old male who presents to the clinic today for consultation on left lower leg and foot numbness and weakness. He is referred to the Neurosurgery Clinic by Dr. Loyola of [facility].   Today, Mr. Barnett reports a 5-month history of symptoms. He describes constant numbness from his left knee to his foot, mainly on the lateral aspect. He is also experiencing left plantar flexion weakness. He is no longer experiencing any pain what-so-ever. No alleviating or agravating symptoms as he is only experiencing constant numbness and weakness. No mechanism of injury such as trauma or a fall is associated with the onset of the pain. There are no bowel or bladder changes. The patient has participated in conservative therapies to include physical therapy, traction, contrast baths and a left L5-S1 TF epidural steroid injection administered on 02/28/2020. None of these modalities have provided any significant long term relief.       Current Outpatient Medications:      cetirizine (ZYRTEC) 10 MG tablet, Take 10 mg by mouth daily, Disp: , Rfl:      No Known Allergies     History reviewed. No pertinent past medical history.     ROS: 10 point review of symptoms are negative other than the symptoms noted above in the HPI.     Family History has been reviewed with the patient, there are no pertinent findings to presenting concern.     History reviewed. No pertinent surgical history.     Social History     Tobacco Use     Smoking status: Never Smoker     Smokeless tobacco: Never Used   Substance Use Topics     Alcohol use: Never     Drug use: Never        OBJECTIVE:   /82   Pulse 78   Resp 18   Ht 6' (1.829 m)   Wt 240 lb (108.9 kg)   SpO2 99%   BMI 32.55 kg/m     Body mass index is 32.55 kg/m .     Imaging:     Lumbar MRI WO - [facility]    Findings, per radiologist read, notable for:      L5-S1 HNP     Full  radiological report in chart. Imaging reviewed with with patient today.     Exam:     Patient appears comfortable, conversational, and in no apparent distress.   Head: Normocephalic, without obvious abnormality, atraumatic, no facial asymmetry.   Eyes: conjunctivae/corneas clear. PERRL, EOM's intact.   Throat: lips, mucosa, and tongue normal; teeth and gums normal.   Neck: supple, symmetrical, trachea midline, no adenopathy and thyroid: not enlarged, symmetric, no tenderness/mass/nodules.   Lungs: clear to auscultation bilaterally.   Heart: regular rate and rhythm.   Abdomen: soft, non-tender; bowel sounds normal; no masses, no organomegaly.   Pulses: 2+ and symmetric.   Skin: Skin color, texture, turgor normal. No rashes or lesions.     CN II-XII grossly intact, alert and appropriate with conversation and following commands.   Gait is non-antalgic. Able to tandem walk. Unable to walk on left toes without difficulty.    Cervical spine is non tender to palpation. Appropriate range of motion of neck, not concerning for lhermitte's phenomenon.   Bilateral bicep 2/4 and tricep reflexes 1/4. Sensation intact throughout upper extremities.     UE muscle strength  Right  Left    Deltoid  5/5  5/5    Biceps  5/5  5/5    Triceps  5/5  5/5    Hand intrinsics  5/5  5/5    Hand grasp  5/5  5/5    Juan signs  neg  neg      Lumbar spine is non tender to palpation   Intact diminished throughout left lower extremities.   Bilateral patellar 2/4 and achilles reflex 1/4. Negative for pain with straight leg raise.     LE muscle strength  Right  Left    Iliopsoas (hip flexion)  5/5  5/5    Quad (knee extension)  5/5  5/5    Hamstring (knee flexion)  5/5  5/5    Gastrocnemius (PF)  5/5  4/5    Tibialis Ant. (DF)  5/5  5/5    EHL  5/5  5/5      Negative Babinski bilaterally. Negative for clonus.   Calves are soft and non-tender bilaterally.     ASSESSMENT/PLAN:     Jeremy Barnett is a 59 year old male who presents to the clinic for  consultation on  left lower leg and foot numbness and weakness, there is no pain. The patient's most recent imaging was reviewed with he and his wife today. It was explained that images show a L5-S1 HNP, which correlates to today's clinical examination. On exam, the patient is noted to have left plantar flexion weakness and diminished sensation from the knee to the foot. The patient has attempted conservative management with physical therapy, traction, contrast baths and a left L5-S1 TF epidural steroid injection, without resolution of symptoms.     He has failed conservative management, and therefore, we feel that he would be best amendable to surgical intervention. We discussed surgical options that included a left L5-S1 hemilaminectomy microdiscectomy. We also discussed continuing with non-surgical options, although the risk of permanent nerve damage is greater with continued delay of relieving the pressure from the nerve due to the stenosis.   We reviewed the surgical risks including nerve damage, infection, bleeding, residual or recurrent disk / symptoms and spinal fluid leak. This will also be performed utilizing general anesthesia which can pose both cardiovascular and respiratory risks as well.     We described the procedure as being a minimally invasive same day surgery to be performed at [facility]. We also discussed the normal postoperative recovery that would include not lifting anything greater than 5-10 pounds, avoid excessive bending, twisting, and turning and to make frequent position changes about every 30 minutes going from sitting, standing and walking for six weeks approximately. We also discussed the timing of post-operative follow-up appointments in the Neurosurgery Clinic.     We did review the images together and explained his condition and the recommended treatment. We also discussed signs of a worsening problem that he should seek being evaluated.   He appeared to have a good  understanding of the situation, asked appropriate questions which we answered, and wishes to discuss this with his wife prior to proceeding with surgical intervention.     He will call the clinic with any decision he has made. Our cards with contact information  were provided  In the event that patient's symptoms worsen or change we would like to see him sooner. We also discussed signs of a worsening problem that he should seek being evaluated.        Respectfully,     ANGE Galicia, ABRAHAM Estrada met with the patient today and reviewed the above plan.

## 2020-03-09 ENCOUNTER — PREP FOR PROCEDURE (OUTPATIENT)
Dept: NEUROLOGY | Facility: CLINIC | Age: 60
End: 2020-03-09

## 2020-03-09 DIAGNOSIS — M51.16 HERNIATION OF LUMBAR INTERVERTEBRAL DISC WITH RADICULOPATHY: Primary | ICD-10-CM

## 2020-03-11 PROBLEM — M51.16 HERNIATION OF LUMBAR INTERVERTEBRAL DISC WITH RADICULOPATHY: Status: ACTIVE | Noted: 2020-03-11

## 2020-03-12 ENCOUNTER — MYC MEDICAL ADVICE (OUTPATIENT)
Dept: PODIATRY | Facility: CLINIC | Age: 60
End: 2020-03-12

## 2020-03-16 ENCOUNTER — HOSPITAL ENCOUNTER (OUTPATIENT)
Facility: CLINIC | Age: 60
Discharge: HOME | End: 2020-03-16
Attending: INTERNAL MEDICINE | Admitting: INTERNAL MEDICINE
Payer: COMMERCIAL

## 2020-03-16 VITALS
HEART RATE: 73 BPM | OXYGEN SATURATION: 97 % | RESPIRATION RATE: 10 BRPM | SYSTOLIC BLOOD PRESSURE: 123 MMHG | DIASTOLIC BLOOD PRESSURE: 81 MMHG

## 2020-03-16 LAB — COLONOSCOPY: NORMAL

## 2020-03-16 PROCEDURE — 88305 TISSUE EXAM BY PATHOLOGIST: CPT | Performed by: INTERNAL MEDICINE

## 2020-03-16 PROCEDURE — 25000128 H RX IP 250 OP 636: Performed by: INTERNAL MEDICINE

## 2020-03-16 PROCEDURE — 88305 TISSUE EXAM BY PATHOLOGIST: CPT | Mod: 26 | Performed by: INTERNAL MEDICINE

## 2020-03-16 PROCEDURE — G0500 MOD SEDAT ENDO SERVICE >5YRS: HCPCS | Performed by: INTERNAL MEDICINE

## 2020-03-16 PROCEDURE — 45385 COLONOSCOPY W/LESION REMOVAL: CPT | Performed by: INTERNAL MEDICINE

## 2020-03-16 RX ORDER — ONDANSETRON 2 MG/ML
4 INJECTION INTRAMUSCULAR; INTRAVENOUS
Status: DISCONTINUED | OUTPATIENT
Start: 2020-03-16 | End: 2020-03-16 | Stop reason: HOSPADM

## 2020-03-16 RX ORDER — LIDOCAINE 40 MG/G
CREAM TOPICAL
Status: DISCONTINUED | OUTPATIENT
Start: 2020-03-16 | End: 2020-03-16 | Stop reason: HOSPADM

## 2020-03-16 RX ORDER — FENTANYL CITRATE 50 UG/ML
INJECTION, SOLUTION INTRAMUSCULAR; INTRAVENOUS PRN
Status: DISCONTINUED | OUTPATIENT
Start: 2020-03-16 | End: 2020-03-16 | Stop reason: HOSPADM

## 2020-03-16 NOTE — LETTER
February 27, 2020      Jeremy Barnett  [address] [place] [address]        Dear Jeremy,   Please be aware that coverage of these services is subject to the terms and limitations of your health insurance plan.  Call member services at your health plan with any benefit or coverage questions.    Thank you for choosing [facility]. You are scheduled for the following service(s):    Date:  3/16/20             Procedure:  COLONOSCOPY  Doctor:        Dr. Pérez   Arrival Time:  1010  *Check in at Emergency/Endoscopy desk*  Procedure Time:  1040      Location:   [facility]        Endoscopy Department, First Floor (Enter through ER Doors) *        [address] [place], [place] [address]      [phone] or [phone] ([facility]) to reschedule      MIRALAX -GATORADE  PREP  Colonoscopy is the most accurate test to detect colon polyps and colon cancer; and the only test where polyps can be removed. During this procedure, a doctor examines the lining of your large intestine and rectum through a flexible tube.   Transportation  You must arrange for a ride for the day of your procedure with a responsible adult. A taxi , Uber, etc, is not an option unless you are accompanied by a responsible adult. If you fail to arrange transportation with a responsible adult, your procedure will be cancelled and rescheduled.    Purchase the  following supplies at your local pharmacy:  - 2 (two) bisacodyl tablets: each tablet contains 5 mg.  (Dulcolax  laxative NOT Dulcolax  stool softener)   - 1 (one) 8.3 oz bottle of Polyethylene Glycol (PEG) 3350 Powder   (MiraLAX , Smooth LAX , ClearLAX  or equivalent)  - 64 oz Gatorade    Regular Gatorade, Gatorade G2 , Powerade , Powerade Zero  or Pedialyte  is acceptable. Red colored flavors are not allowed; all other colors (yellow, green, orange, purple and blue) are okay. It is also okay to buy two 2.12 oz packets of powdered Gatorade that  can be mixed with water to a total volume of 64 oz of liquid.  - 1 (one) 10 oz bottle of Magnesium Citrate (Red colored flavors are not allowed)  It is also okay for you to use a 0.5 oz package of powdered magnesium citrate (17 g) mixed with 10 oz of water.      PREPARATION FOR COLONOSCOPY    7 days before:    Discontinue fiber supplements and medications containing iron. This includes Metamucil  and Fibercon ; and multivitamins with iron.    3 days before:    Begin a low-fiber diet. A low-fiber diet helps making the cleanout more effective.     Examples of a low-fiber diet include (but are not limited to): white bread, white rice, pasta, crackers, fish, chicken, eggs, ground beef, creamy peanut butter, cooked/steamed/boiled vegetables, canned fruit, bananas, melons, milk, plain yogurt cheese, salad dressing and other condiments.     The following are not allowed on a low-fiber diet: seeds, nuts, popcorn, bran, whole wheat, corn, quinoa, raw fruits and vegetables, berries and dried fruit, beans and lentils.    For additional details on low-fiber diet, please refer to the table on the last page.    2 days before:    Continue the low-fiber diet.     Drink at least 8 glasses of water throughout the day.     Stop eating solid foods at 11:45 pm.    1 day before:    In the morning: begin a clear liquid diet (liquids you can see through).     Examples of a clear liquid diet include: water, clear broth or bouillon, Gatorade, Pedialyte or Powerade, carbonated and non-carbonated soft drinks (Sprite , 7-Up , ginger ale), strained fruit juices without pulp (apple, white grape, white cranberry), Jell-O  and popsicles.     The following are not allowed on a clear liquid diet: red liquids, alcoholic beverages, dairy products (milk, creamer, and yogurt), protein shakes, creamy broths, juice with pulp and chewing tobacco.    At noon: take 2 (two) bisacodyl tablets     At 4 (and no later than 6pm): start drinking the Miralax-Gatorade  preparation (8.3 oz of Miralax mixed with 64 oz of Gatorade in a large pitcher). Drink 1(one) 8 oz glass every 15 minutes thereafter, until the mixture is gone.    COLON CLEANSING TIPS: drink adequate amounts of fluids before and after your colon cleansing to prevent dehydration. Stay near a toilet because you will have diarrhea. Even if you are sitting on the toilet, continue to drink the cleansing solution every 15 minutes. If you feel nauseous or vomit, rinse your mouth with water, take a 15 to 30-minute-break and then continue drinking the solution. You will be uncomfortable until the stool has flushed from your colon (in about 2 to 4 hours). You may feel chilled.    Day of your procedure  You may take all of your morning medications including blood pressure medications, blood thinners (if you have not been instructed to stop these by our office), methadone, anti-seizure medications with sips of water 3 hours prior to your procedure or earlier. Do not take insulin or vitamins prior to your procedure. Continue the clear liquid diet.       4 hours prior: drink 10 oz of magnesium citrate. It may be easier to drink it with a straw.    STOP consuming all liquids after that.     Do not take anything by mouth during this time.     Allow extra time to travel to your procedure as you may need to stop and use a restroom along the way.    You are ready for the procedure, if you followed all instructions and your stool is no longer formed, but clear or yellow liquid. If you are unsure whether your colon is clean, please call our office at [phone] before you leave for your appointment.    Bring the following to your procedure:  - Insurance Card/Photo ID.   - List of current medications including over-the-counter medications and supplements.   - Your rescue inhaler if you currently use one to control asthma.    Canceling or rescheduling your appointment:   If you must cancel or reschedule your appointment, please call  [phone] as soon as possible.      COLONOSCOPY PRE-PROCEDURE CHECKLIST    If you have diabetes, ask your regular doctor for diet and medication restrictions.  If you take an anticoagulant or anti-platelet medication (such as Coumadin , Lovenox , Pradaxa , Xarelto , Eliquis , etc.), please call your primary doctor for advice on holding this medication.  If you take aspirin you may continue to do so.  If you are or may be pregnant, please discuss the risks and benefits of this procedure with your doctor.        What happens during a colonoscopy?    Plan to spend up to two hours, starting at registration time, at the endoscopy center the day of your procedure. The colonoscopy takes an average of 15 to 30 minutes. Recovery time is about 30 minutes.      Before the exam:    You will change into a gown.    Your medical history and medication list will be reviewed with you, unless that has been done over the phone prior to the procedure.     A nurse will insert an intravenous (IV) line into your hand or arm.    The doctor will meet with you and will give you a consent form to sign.  During the exam:     Medicine will be given through the IV line to help you relax.     Your heart rate and oxygen levels will be monitored. If your blood pressure is low, you may be given fluids through the IV line.     The doctor will insert a flexible hollow tube, called a colonoscope, into your rectum. The scope will be advanced slowly through the large intestine (colon).    You may have a feeling of fullness or pressure.     If an abnormal tissue or a polyp is found, the doctor may remove it through the endoscope for closer examination, or biopsy. Tissue removal is painless    After the exam:           Any tissue samples removed during the exam will be sent to a lab for evaluation. It may take 5-7 working days for you to be notified of the results.     A nurse will provide you with complete discharge instructions before you leave the  endoscopy center. Be sure to ask the nurse for specific instructions if you take blood thinners such as Aspirin, Coumadin or Plavix.     The doctor will prepare a full report for you and for the physician who referred you for the procedure.     Your doctor will talk with you about the initial results of your exam.      Medication given during the exam will prohibit you from driving for the rest of the day.     Following the exam, you may resume your normal diet. Your first meal should be light, no greasy foods. Avoid alcohol until the next day.     You may resume your regular activities the day after the procedure.         LOW-FIBER DIET    Foods RECOMMENDED Foods to AVOID   Breads, Cereal, Rice and Pasta:   White bread, rolls, biscuits, croissant and josephine toast.   Waffles, Estonian toast and pancakes.   White rice, noodles, pasta, macaroni and peeled cooked potatoes.   Plain crackers and saltines.   Cooked cereals: farina, cream of rice.   Cold cereals: Puffed Rice , Rice Krispies , Corn Flakes  and Special K    Breads, Cereal, Rice and Pasta:   Breads or rolls with nuts, seeds or fruit.   Whole wheat, pumpernickel, rye breads and cornbread.   Potatoes with skin, brown or wild rice, and kasha (buckwheat).     Vegetables:   Tender cooked and canned vegetables without seeds: carrots, asparagus tips, green or wax beans, pumpkin, spinach, lima beans. Vegetables:   Raw or steamed vegetables.   Vegetables with seeds.   Sauerkraut.   Winter squash, peas, broccoli, Brussel sprouts, cabbage, onions, cauliflower, baked beans, peas and corn.   Fruits:   Strained fruit juice.   Canned fruit, except pineapple.   Ripe bananas and melon. Fruits:   Prunes and prune juice.   Raw fruits.   Dried fruits: figs, dates and raisins.   Milk/Dairy:   Milk: plain or flavored.   Yogurt, custard and ice cream.   Cheese and cottage cheese Milk/Dairy:     Meat and other proteins:   ground, well-cooked tender beef, lamb, ham, veal, pork, fish,  poultry and organ meats.   Eggs.   Peanut butter without nuts. Meat and other proteins:   Tough, fibrous meats with gristle.   Dry beans, peas and lentils.   Peanut butter with nuts.   Tofu.   Fats, Snack, Sweets, Condiments and Beverages:   Margarine, butter, oils, mayonnaise, sour cream and salad dressing, plain gravy.   Sugar, hard candy, clear jelly, honey and syrup.   Spices, cooked herbs, bouillon, broth and soups made with allowed vegetable, ketchup and mustard.   Coffee, tea and carbonated drinks.   Plain cakes, cookies and pretzels.   Gelatin, plain puddings, custard, ice cream, sherbet and popsicles. Fats, Snack, Sweets, Condiments and Beverages:   Nuts, seeds and coconut.   Jam, marmalade and preserves.   Pickles, olives, relish and horseradish.   All desserts containing nuts, seeds, dried fruit and coconut; or made from whole grains or bran.   Candy made with nuts or seeds.   Popcorn.           DIRECTIONS TO THE ENDOSCOPY DEPARTMENT     From the north ([facility])  Take [address] South, exit on [address] [address]. Get into the left hand ricci, turn left (east), go one-half mile to [address] and turn left. Go north to the first stoplight, take a right on [address] and follow it to the Emergency entrance.    From the south ([facility])  Take [address]N to the [address]E split and exit on [address] [address]. On [address] [address], turn left (west) to [address]. Turn right (north) on [address]. Go north to the first stoplight, take a right on [address] and follow it to the Emergency entrance.    From the east via 35E ([facility])  Take [address]E south to [address] [address] exit. Turn right on [address] [address]. Go west to [address]. Turn right (north) on [address]. Go to the first stoplight, take a right and follow on [address] to the Emergency entrance.    From the east via [address] ([facility])  Take [address] to [address]. Turn left (south) on  [address] to [address]. Turn left (east) on [address] and follow it to the Emergency entrance.    From the west via [address] ([place])  Take [address] east to [address] [place] [address]. Turn right (south) on [address] to [address]. Turn left (east) on [address] and follow it to the Emergency entrance.

## 2020-03-16 NOTE — DISCHARGE INSTRUCTIONS
Understanding Colon and Rectal Polyps     The colon has a smooth lining composed of millions of cells.     The colon (also called the large intestine) is a muscular tube that forms the last part of the digestive tract. It absorbs water and stores food waste. The colon is about 4 to 6 feet long. The rectum is the last 6 inches of the colon. The colon and rectum have a smooth lining composed of millions of cells. Changes in these cells can lead to growths in the colon that can become cancerous and should be removed.     When the Colon Lining Changes  Changes that occur in the cells that line the colon or rectum can lead to growths called polyps. Over a period of years, polyps can turn cancerous. Removing polyps early may prevent cancer from ever forming.      Polyps  Polyps are fleshy clumps of tissue that form on the lining of the colon or rectum. Small polyps are usually benign (not cancerous). However, over time, cells in a polyp can change and become cancerous. The larger a polyp grows, the more likely this is to happen. Also, certain types of polyps known as adenomatous polyps are considered premalignant. This means that they will almost always become cancerous if they re not removed.          Cancer  Almost all colorectal cancers start when polyp cells begin growing abnormally. As a cancerous tumor grows, it may involve more and more of the colon or rectum. In time, cancer can also grow beyond the colon or rectum and spread to nearby organs or to glands called lymph nodes. The cells can also travel to other parts of the body. This is known as metastasis. The earlier a cancerous tumor is removed, the better the chance of preventing its spread.        6258-2275 [facility], [address] [place] [address], [place] [address]. All rights reserved. This information is not intended as a substitute for professional medical care. Always follow your healthcare professional's instructions.

## 2020-03-17 LAB — COPATH REPORT: NORMAL

## 2020-03-31 ENCOUNTER — TELEPHONE (OUTPATIENT)
Dept: NEUROSURGERY | Facility: CLINIC | Age: 60
End: 2020-03-31

## 2020-03-31 DIAGNOSIS — M54.16 LUMBAR RADICULOPATHY: Primary | ICD-10-CM

## 2020-03-31 NOTE — TELEPHONE ENCOUNTER
Returned call to patient, he is requesting any alteratives to surgery. He is also concerned about the possibility of permanent nerve damage to his left leg, if surgery is delayed. Informed patient that would send message to care team to review.

## 2020-03-31 NOTE — TELEPHONE ENCOUNTER
Reason For Call: Called patient to let him know that his surgery would be cancelled until further notice, he is concerned that without the surgery he will have a permanent limp, he would like a call back to discuss this as soon as possible, he can be reached at [phone].

## 2020-04-01 NOTE — TELEPHONE ENCOUNTER
Patient returned call reviewed below message per Reji Sharma PA-C. Patient would like to try lumbar traction, will place order for PT to include traction.    Patient also wishes for Dr. Estrada to review to weigh the possibility of permanent nerve damage by delaying the surgery. Will forward message for Dr. Estrada to review when he returns on 4/3.

## 2020-04-02 NOTE — TELEPHONE ENCOUNTER
Patient is calling requesting a call back from Felix again. Please call the patient back @ [phone]

## 2020-04-02 NOTE — TELEPHONE ENCOUNTER
Returned call to patient the soonest [facility] is able to see patient for eval and traction is 1 month from now. Patient is going to call around to other facilities to see if they can accommodate an appointment sooner. He will return call to clinic if he can get in sooner to another facility and we can fax a new order.

## 2020-04-02 NOTE — TELEPHONE ENCOUNTER
Patient is requesting a call from Felix regarding their conversation yesterday. Please call the patient at [phone]

## 2020-04-02 NOTE — TELEPHONE ENCOUNTER
Patient returned call stating that [facility] is able to see him for PT/lumbar traction on 4/6/20. New order placed and faxed to [facility] @ [phone].

## 2020-04-09 ENCOUNTER — TRANSFERRED RECORDS (OUTPATIENT)
Dept: HEALTH INFORMATION MANAGEMENT | Facility: CLINIC | Age: 60
End: 2020-04-09

## 2020-04-13 ENCOUNTER — TELEPHONE (OUTPATIENT)
Dept: PHYSICAL THERAPY | Facility: CLINIC | Age: 60
End: 2020-04-13

## 2020-04-14 ENCOUNTER — TELEPHONE (OUTPATIENT)
Dept: NEUROSURGERY | Facility: CLINIC | Age: 60
End: 2020-04-14

## 2020-04-14 NOTE — TELEPHONE ENCOUNTER
Called patient at the request of Saturnino Sharma PA-C to offer patient an appointment to discuss surgical options with Dr. Estrada. Per Saturnino ROSARIO the manager of [facility] reached out to him regarding patient deciding to go to [facility] for lumbar traction as recommended at telephone encounter on 3/31/20. Per Saturnino ROSARIO patient may be having an increase in symptoms.    Called patient on 4/13 to discuss, patient states that his symptoms haven't changed (left leg weakness/limp), he has been doing PT since November. He has recently started a new PT regimen with [facility] symptoms have remained the same since LOV on 3/2/20. Patient is asking if he needs to talk about surgery again since nothing has changed.    Spoke with Dr. Estrada, who is still recommending rescheduling surgery post COVID-19. If patient has any specific question or concerns, would be happy to discuss over telephone visit.     Left detailed message with patient explaining the above per Dr. Estrada, asked patient to return call with any questions or concerns.

## 2020-04-14 NOTE — TELEPHONE ENCOUNTER
Spoke with patient he is okay with continuing with physical therapy through [facility] and rescheduling surgery post COVID-19. Patient instructed to continue monitor symptoms and if any changes or red flag symptoms occur to contact us immediately. Patient verbalized understanding.

## 2020-05-04 ENCOUNTER — TELEPHONE (OUTPATIENT)
Dept: NEUROSURGERY | Facility: CLINIC | Age: 60
End: 2020-05-04

## 2020-05-04 NOTE — PATIENT INSTRUCTIONS
Patient Instructions  Pre-Operative:    -Surgery scheduled at [facility] for Left L5-S1 Microdiscectomy, with Dr. Estrada   -Surgical risks: blood clots in the leg or lung, problems urinating, nerve damage, drainage from the incision, infection,stiffness  - Pre-operative physical with primary care physician within 30 days of surgical date.   - Likely same day procedure with discharge home day of surgery, may stay for 23 hour observation hospitalization for monitoring    -Shower procedure: please shower with antimicrobial soap the night before surgery and morning of surgery. Please refer to showering instruction sheet in folder.  -Stop all solid foods 8 hours before surgery.  -Keep drinking clear liquids until 4 hours before surgery. Clear liquids include water, clear juice, black coffee, or clear tea without milk, Gatorade, clear soda.   - Discontinue Aspirin, NSAIDs (Advil/Ibuprofen, Naproxen,Nuprin,Relafen/Nabumetone, Diclofenac,Meloxicam, Aleve, Celebrex) x 7 days prior to surgical date.    - May try tylenol for pain 1000 mg three times per day for pain      Post-Operative:  - you may resume taking NSAIDs immediately post op   - Post operative incisional pain which will require pain medications and muscle relaxants. You will receive medication upon discharge.  -Do NOT drive while taking narcotic pain medication.  -Do not drink alcohol while using any pain medication  -Post operative incision care- Watch for signs of infection: redness, swelling, warmth, drainage, and fever of 101 degrees or higher. Notify clinic [phone].  -No submerging incision in water such as pools, hot tubs,baths for at least 8 weeks or until incision is healed. Showers are fine.   - Post operative activity limitations: 6-8 weeks, no lifting > 10 pounds, limited bending, twisting, or overhead reaching.  -Ok to walk as tolerated, avoid bed rest and prolonged sitting.  -No contact sports until after follow up visit  -No high  impact activities such as; running/jogging, snowmobile or 4 fried riding or any other recreational vehicles.  - Post op follow up appointments: 2 week incision check with RN, 6 week post op follow up visit with Nurse practitioner or Physician Assistant, 3 months with Dr. Estrada. Please call to schedule follow up appointment at [phone].  -If you are currently employed, you will need to be off work for 2-4 weeks for post op recovery and healing. Please fax any FMLA/short term disability paperwork to [phone]. You may call our clinic when you'd like to return to work and we can provide a work letter.

## 2020-05-04 NOTE — TELEPHONE ENCOUNTER
Returned call to patient and went over below instruction and education. Patient would like to  folder and soap from the  in [place].  Patient Instructions  Pre-Operative:    -Surgery scheduled at [facility] for Left L5-S1 Microdiscectomy, with Dr. Estrada   -Surgical risks: blood clots in the leg or lung, problems urinating, nerve damage, drainage from the incision, infection,stiffness  - Pre-operative physical with primary care physician within 30 days of surgical date.   - Likely same day procedure with discharge home day of surgery, may stay for 23 hour observation hospitalization for monitoring    -Shower procedure: please shower with antimicrobial soap the night before surgery and morning of surgery. Please refer to showering instruction sheet in folder.  -Stop all solid foods 8 hours before surgery.  -Keep drinking clear liquids until 4 hours before surgery. Clear liquids include water, clear juice, black coffee, or clear tea without milk, Gatorade, clear soda.   - Discontinue Aspirin, NSAIDs (Advil/Ibuprofen, Naproxen,Nuprin,Relafen/Nabumetone, Diclofenac,Meloxicam, Aleve, Celebrex) x 7 days prior to surgical date.    - May try tylenol for pain 1000 mg three times per day for pain      Post-Operative:  - you may resume taking NSAIDs immediately post op   - Post operative incisional pain which will require pain medications and muscle relaxants. You will receive medication upon discharge.  -Do NOT drive while taking narcotic pain medication.  -Do not drink alcohol while using any pain medication  -Post operative incision care- Watch for signs of infection: redness, swelling, warmth, drainage, and fever of 101 degrees or higher. Notify clinic [phone].  -No submerging incision in water such as pools, hot tubs,baths for at least 8 weeks or until incision is healed. Showers are fine.   - Post operative activity limitations: 6-8 weeks, no lifting > 10 pounds, limited bending,  twisting, or overhead reaching.  -Ok to walk as tolerated, avoid bed rest and prolonged sitting.  -No contact sports until after follow up visit  -No high impact activities such as; running/jogging, snowmobile or 4 fried riding or any other recreational vehicles.  - Post op follow up appointments: 2 week incision check with RN, 6 week post op follow up visit with Nurse practitioner or Physician Assistant, 3 months with Dr. Estrada. Please call to schedule follow up appointment at [phone].  -If you are currently employed, you will need to be off work for 2-4 weeks for post op recovery and healing. Please fax any FMLA/short term disability paperwork to [phone]. You may call our clinic when you'd like to return to work and we can provide a work letter.

## 2020-05-04 NOTE — TELEPHONE ENCOUNTER
Patient will be having surgery 5/13 with  and has some questions regarding surgery and recovery time. Please call the patient back @ [phone]

## 2020-05-05 ENCOUNTER — OFFICE VISIT (OUTPATIENT)
Dept: PEDIATRICS | Facility: CLINIC | Age: 60
End: 2020-05-05
Payer: COMMERCIAL

## 2020-05-05 VITALS
HEIGHT: 72 IN | BODY MASS INDEX: 32.7 KG/M2 | TEMPERATURE: 97.1 F | HEART RATE: 76 BPM | OXYGEN SATURATION: 99 % | SYSTOLIC BLOOD PRESSURE: 138 MMHG | WEIGHT: 241.4 LBS | DIASTOLIC BLOOD PRESSURE: 80 MMHG

## 2020-05-05 DIAGNOSIS — M51.16 HERNIATION OF LUMBAR INTERVERTEBRAL DISC WITH RADICULOPATHY: ICD-10-CM

## 2020-05-05 DIAGNOSIS — Z01.818 PREOP GENERAL PHYSICAL EXAM: Primary | ICD-10-CM

## 2020-05-05 PROCEDURE — 93000 ELECTROCARDIOGRAM COMPLETE: CPT | Performed by: NURSE PRACTITIONER

## 2020-05-05 PROCEDURE — 99214 OFFICE O/P EST MOD 30 MIN: CPT | Performed by: NURSE PRACTITIONER

## 2020-05-05 ASSESSMENT — MIFFLIN-ST. JEOR: SCORE: 1937.98

## 2020-05-05 NOTE — PROGRESS NOTES
[facility] [place]  [address]  [address]  [place] [address]  [phone]  Dept: [phone]    PRE-OP EVALUATION:  Today's date: 2020    Jeremy Barnett (: 1960) presents for pre-operative evaluation assessment as requested by Dr. alee rawls.  He requires evaluation and anesthesia risk assessment prior to undergoing surgery/procedure for treatment of S1 nerve discetomy .    Proposed Surgery/ Procedure: S1 nerve discetomy  Date of Surgery/ Procedure: 2020  Time of Surgery/ Procedure: unsure   Hospital/Surgical Facility: [facility]     Primary Physician: Annalisa Nelson  Type of Anesthesia Anticipated: to be determined    Patient has a Health Care Directive or Living Will:  YES     1. NO - Do you have a history of heart attack, stroke, stent, bypass or surgery on an artery in the head, neck, heart or legs?  2. NO - Do you ever have any pain or discomfort in your chest?  3. NO - Do you have a history of  Heart Failure?  4. NO - Are you troubled by shortness of breath when: walking on the level, up a slight hill or at night?  5. NO - Do you currently have a cold, bronchitis or other respiratory infection?  6. NO - Do you have a cough, shortness of breath or wheezing?  7. NO - Do you sometimes get pains in the calves of your legs when you walk?  8. NO - Do you or anyone in your family have previous history of blood clots?  9. NO - Do you or does anyone in your family have a serious bleeding problem such as prolonged bleeding following surgeries or cuts?  10. NO - Have you ever had problems with anemia or been told to take iron pills?  11. NO - Have you had any abnormal blood loss such as black, tarry or bloody stools, or abnormal vaginal bleeding?  12. NO - Have you ever had a blood transfusion?  13. NO - Have you or any of your relatives ever had problems with anesthesia?  14. NO - Do you have sleep apnea, excessive snoring or daytime drowsiness?  15. NO - Do you  have any prosthetic heart valves?  16. NO - Do you have prosthetic joints?  17. NO - Is there any chance that you may be pregnant?      HPI:     HPI related to upcoming procedure: herniation of lumbar disc      See problem list for active medical problems.  Problems all longstanding and stable, except as noted/documented.  See ROS for pertinent symptoms related to these conditions.      MEDICAL HISTORY:     Patient Active Problem List    Diagnosis Date Noted     Herniation of lumbar intervertebral disc with radiculopathy 03/11/2020     Priority: Medium     Added automatically from request for surgery [id number]        History reviewed. No pertinent past medical history.  Past Surgical History:   Procedure Laterality Date     ENT SURGERY       SOFT TISSUE SURGERY       No current outpatient medications on file.     OTC products: None, except as noted above, no recent use of OTC ASA, NSAIDS or Steroids and no use of herbal medications or other supplements    No Known Allergies   Latex Allergy: NO    Social History     Tobacco Use     Smoking status: Never Smoker     Smokeless tobacco: Never Used   Substance Use Topics     Alcohol use: Yes     Comment: occa     History   Drug Use Unknown       REVIEW OF SYSTEMS:   Constitutional, neuro, ENT, endocrine, pulmonary, cardiac, gastrointestinal, genitourinary, musculoskeletal, integument and psychiatric systems are negative, except as otherwise noted.    EXAM:   /80 (BP Location: Right arm, Patient Position: Chair, Cuff Size: Adult Large)   Pulse 76   Temp 97.1  F (36.2  C) (Tympanic)   Ht 1.829 m (6')   Wt 109.5 kg (241 lb 6.4 oz)   SpO2 99%   BMI 32.74 kg/m      GENERAL APPEARANCE: healthy, alert and no distress     EYES: EOMI,  PERRL     HENT: ear canals and TM's normal and nose and mouth without ulcers or lesions     NECK: no adenopathy, no asymmetry, masses, or scars and thyroid normal to palpation     RESP: lungs clear to auscultation - no rales, rhonchi or  wheezes     CV: regular rates and rhythm, normal S1 S2, no S3 or S4 and no murmur, click or rub     ABDOMEN:  soft, nontender, no HSM or masses and bowel sounds normal     MS: extremities normal- no gross deformities noted, no evidence of inflammation in joints, FROM in all extremities.     SKIN: no suspicious lesions or rashes     NEURO: Normal strength and tone, sensory exam grossly normal, mentation intact and speech normal     PSYCH: mentation appears normal. and affect normal/bright     LYMPHATICS: No cervical adenopathy    DIAGNOSTICS:   EKG: appears normal, NSR, normal axis, normal intervals, no acute ST/T changes c/w ischemia, no LVH by voltage criteria, unchanged from previous tracings    Recent Labs   Lab Test 02/26/20  0953      POTASSIUM 4.4   CR 1.18      IMPRESSION:   Reason for surgery/procedure: preop  Diagnosis/reason for consult: herniation of lumbar disc    The proposed surgical procedure is considered INTERMEDIATE risk.    REVISED CARDIAC RISK INDEX  The patient has the following serious cardiovascular risks for perioperative complications such as (MI, PE, VFib and 3  AV Block):  No serious cardiac risks  INTERPRETATION: 1 risks: Class II (low risk - 0.9% complication rate)    The patient has the following additional risks for perioperative complications:  No identified additional risks      ICD-10-CM    1. Preop general physical exam  Z01.818 EKG 12-lead complete w/read - Clinics     COVID-19 Virus (Coronavirus) by PCR Nasopharyngeal swab   2. Herniation of lumbar intervertebral disc with radiculopathy  M51.16 COVID-19 Virus (Coronavirus) by PCR Nasopharyngeal swab       RECOMMENDATIONS:     --Consult hospital rounder / IM to assist post-op medical management    --Patient is to take all scheduled medications on the day of surgery EXCEPT for modifications listed below.    APPROVAL GIVEN to proceed with proposed procedure, without further diagnostic evaluation       Signed Electronically by:  Corie Kovacs, APRN CNP    Copy of this evaluation report is provided to requesting physician.    [place] Preop Guidelines    Revised Cardiac Risk Index

## 2020-05-07 RX ORDER — FEXOFENADINE HCL 180 MG/1
180 TABLET ORAL DAILY PRN
COMMUNITY

## 2020-05-11 ENCOUNTER — OFFICE VISIT (OUTPATIENT)
Dept: URGENT CARE | Facility: URGENT CARE | Age: 60
End: 2020-05-11
Attending: NURSE PRACTITIONER
Payer: COMMERCIAL

## 2020-05-11 DIAGNOSIS — Z01.818 PREOP GENERAL PHYSICAL EXAM: ICD-10-CM

## 2020-05-11 DIAGNOSIS — M51.16 HERNIATION OF LUMBAR INTERVERTEBRAL DISC WITH RADICULOPATHY: ICD-10-CM

## 2020-05-11 LAB
SARS-COV-2 RNA SPEC QL NAA+PROBE: NOT DETECTED
SPECIMEN SOURCE: NORMAL

## 2020-05-11 PROCEDURE — 87635 SARS-COV-2 COVID-19 AMP PRB: CPT | Mod: 90 | Performed by: NURSE PRACTITIONER

## 2020-05-11 PROCEDURE — 99000 SPECIMEN HANDLING OFFICE-LAB: CPT | Performed by: NURSE PRACTITIONER

## 2020-05-11 PROCEDURE — 99207 ZZC NO BILLABLE SERVICE THIS VISIT: CPT

## 2020-05-13 ENCOUNTER — ANESTHESIA EVENT (OUTPATIENT)
Dept: SURGERY | Facility: CLINIC | Age: 60
End: 2020-05-13
Payer: COMMERCIAL

## 2020-05-13 ENCOUNTER — ANESTHESIA (OUTPATIENT)
Dept: SURGERY | Facility: CLINIC | Age: 60
End: 2020-05-13
Payer: COMMERCIAL

## 2020-05-13 ENCOUNTER — APPOINTMENT (OUTPATIENT)
Dept: GENERAL RADIOLOGY | Facility: CLINIC | Age: 60
End: 2020-05-13
Attending: NEUROLOGICAL SURGERY
Payer: COMMERCIAL

## 2020-05-13 ENCOUNTER — HOSPITAL ENCOUNTER (OUTPATIENT)
Facility: CLINIC | Age: 60
Discharge: HOME | End: 2020-05-13
Attending: NEUROLOGICAL SURGERY | Admitting: NEUROLOGICAL SURGERY
Payer: COMMERCIAL

## 2020-05-13 VITALS
WEIGHT: 238.5 LBS | HEART RATE: 66 BPM | RESPIRATION RATE: 10 BRPM | SYSTOLIC BLOOD PRESSURE: 119 MMHG | HEIGHT: 73 IN | TEMPERATURE: 97.3 F | DIASTOLIC BLOOD PRESSURE: 77 MMHG | OXYGEN SATURATION: 93 % | BODY MASS INDEX: 31.61 KG/M2

## 2020-05-13 DIAGNOSIS — M51.16 HERNIATION OF LUMBAR INTERVERTEBRAL DISC WITH RADICULOPATHY: Primary | ICD-10-CM

## 2020-05-13 DIAGNOSIS — M51.16 HERNIATION OF LUMBAR INTERVERTEBRAL DISC WITH RADICULOPATHY: ICD-10-CM

## 2020-05-13 PROCEDURE — 37000009 ZZH ANESTHESIA TECHNICAL FEE, EACH ADDTL 15 MIN: Performed by: NEUROLOGICAL SURGERY

## 2020-05-13 PROCEDURE — 69990 MICROSURGERY ADD-ON: CPT | Performed by: NEUROLOGICAL SURGERY

## 2020-05-13 PROCEDURE — 36000063 ZZH SURGERY LEVEL 4 EA 15 ADDTL MIN: Performed by: NEUROLOGICAL SURGERY

## 2020-05-13 PROCEDURE — 40000306 ZZH STATISTIC PRE PROC ASSESS II: Performed by: NEUROLOGICAL SURGERY

## 2020-05-13 PROCEDURE — 25000128 H RX IP 250 OP 636: Performed by: NURSE ANESTHETIST, CERTIFIED REGISTERED

## 2020-05-13 PROCEDURE — 71000012 ZZH RECOVERY PHASE 1 LEVEL 1 FIRST HR: Performed by: NEUROLOGICAL SURGERY

## 2020-05-13 PROCEDURE — 25000125 ZZHC RX 250: Performed by: NURSE ANESTHETIST, CERTIFIED REGISTERED

## 2020-05-13 PROCEDURE — 36000065 ZZH SURGERY LEVEL 4 W FLUORO 1ST 30 MIN: Performed by: NEUROLOGICAL SURGERY

## 2020-05-13 PROCEDURE — 27210794 ZZH OR GENERAL SUPPLY STERILE: Performed by: NEUROLOGICAL SURGERY

## 2020-05-13 PROCEDURE — 27210995 ZZH RX 272: Performed by: NEUROLOGICAL SURGERY

## 2020-05-13 PROCEDURE — 37000008 ZZH ANESTHESIA TECHNICAL FEE, 1ST 30 MIN: Performed by: NEUROLOGICAL SURGERY

## 2020-05-13 PROCEDURE — 25000132 ZZH RX MED GY IP 250 OP 250 PS 637: Performed by: ANESTHESIOLOGY

## 2020-05-13 PROCEDURE — 63030 LAMOT DCMPRN NRV RT 1 LMBR: CPT | Mod: LT | Performed by: NEUROLOGICAL SURGERY

## 2020-05-13 PROCEDURE — 63030 LAMOT DCMPRN NRV RT 1 LMBR: CPT | Mod: AS | Performed by: PHYSICIAN ASSISTANT

## 2020-05-13 PROCEDURE — 25000125 ZZHC RX 250: Performed by: NEUROLOGICAL SURGERY

## 2020-05-13 PROCEDURE — 71000027 ZZH RECOVERY PHASE 2 EACH 15 MINS: Performed by: NEUROLOGICAL SURGERY

## 2020-05-13 PROCEDURE — 25000128 H RX IP 250 OP 636: Performed by: NEUROLOGICAL SURGERY

## 2020-05-13 PROCEDURE — 71000013 ZZH RECOVERY PHASE 1 LEVEL 1 EA ADDTL HR: Performed by: NEUROLOGICAL SURGERY

## 2020-05-13 PROCEDURE — 25800030 ZZH RX IP 258 OP 636: Performed by: ANESTHESIOLOGY

## 2020-05-13 PROCEDURE — 40000277 XR SURGERY CARM FLUORO LESS THAN 5 MIN W STILLS: Mod: TC

## 2020-05-13 PROCEDURE — 25800030 ZZH RX IP 258 OP 636: Performed by: NURSE ANESTHETIST, CERTIFIED REGISTERED

## 2020-05-13 RX ORDER — OXYCODONE HYDROCHLORIDE 5 MG/1
5 TABLET ORAL
Status: DISCONTINUED | OUTPATIENT
Start: 2020-05-13 | End: 2020-05-13 | Stop reason: HOSPADM

## 2020-05-13 RX ORDER — OXYCODONE HYDROCHLORIDE 5 MG/1
5-10 TABLET ORAL EVERY 4 HOURS PRN
Qty: 20 TABLET | Refills: 0 | Status: SHIPPED | OUTPATIENT
Start: 2020-05-13 | End: 2023-01-18

## 2020-05-13 RX ORDER — PROPOFOL 10 MG/ML
INJECTION, EMULSION INTRAVENOUS CONTINUOUS PRN
Status: DISCONTINUED | OUTPATIENT
Start: 2020-05-13 | End: 2020-05-13

## 2020-05-13 RX ORDER — METHOCARBAMOL 500 MG/1
500 TABLET, FILM COATED ORAL 4 TIMES DAILY PRN
Qty: 20 TABLET | Refills: 0 | Status: SHIPPED | OUTPATIENT
Start: 2020-05-13 | End: 2023-01-18

## 2020-05-13 RX ORDER — LIDOCAINE HYDROCHLORIDE 40 MG/ML
SOLUTION TOPICAL PRN
Status: DISCONTINUED | OUTPATIENT
Start: 2020-05-13 | End: 2020-05-13

## 2020-05-13 RX ORDER — DEXAMETHASONE SODIUM PHOSPHATE 4 MG/ML
INJECTION, SOLUTION INTRA-ARTICULAR; INTRALESIONAL; INTRAMUSCULAR; INTRAVENOUS; SOFT TISSUE PRN
Status: DISCONTINUED | OUTPATIENT
Start: 2020-05-13 | End: 2020-05-13

## 2020-05-13 RX ORDER — FENTANYL CITRATE 50 UG/ML
INJECTION, SOLUTION INTRAMUSCULAR; INTRAVENOUS PRN
Status: DISCONTINUED | OUTPATIENT
Start: 2020-05-13 | End: 2020-05-13

## 2020-05-13 RX ORDER — ACETAMINOPHEN 325 MG/1
975 TABLET ORAL ONCE
Status: COMPLETED | OUTPATIENT
Start: 2020-05-13 | End: 2020-05-13

## 2020-05-13 RX ORDER — ALBUTEROL SULFATE 0.83 MG/ML
2.5 SOLUTION RESPIRATORY (INHALATION) EVERY 4 HOURS PRN
Status: DISCONTINUED | OUTPATIENT
Start: 2020-05-13 | End: 2020-05-13 | Stop reason: HOSPADM

## 2020-05-13 RX ORDER — CEFAZOLIN SODIUM 1 G/3ML
1 INJECTION, POWDER, FOR SOLUTION INTRAMUSCULAR; INTRAVENOUS SEE ADMIN INSTRUCTIONS
Status: DISCONTINUED | OUTPATIENT
Start: 2020-05-13 | End: 2020-05-13 | Stop reason: HOSPADM

## 2020-05-13 RX ORDER — GABAPENTIN 300 MG/1
300 CAPSULE ORAL ONCE
Status: COMPLETED | OUTPATIENT
Start: 2020-05-13 | End: 2020-05-13

## 2020-05-13 RX ORDER — CEFAZOLIN SODIUM 2 G/100ML
2 INJECTION, SOLUTION INTRAVENOUS
Status: COMPLETED | OUTPATIENT
Start: 2020-05-13 | End: 2020-05-13

## 2020-05-13 RX ORDER — IBUPROFEN 600 MG/1
600 TABLET, FILM COATED ORAL 2 TIMES DAILY
Qty: 28 TABLET | Refills: 0 | Status: SHIPPED | OUTPATIENT
Start: 2020-05-13 | End: 2020-05-27

## 2020-05-13 RX ORDER — AMOXICILLIN 250 MG
1-2 CAPSULE ORAL 2 TIMES DAILY
Qty: 30 TABLET | Refills: 0 | Status: SHIPPED | OUTPATIENT
Start: 2020-05-13 | End: 2023-01-18

## 2020-05-13 RX ORDER — FENTANYL CITRATE 50 UG/ML
25-50 INJECTION, SOLUTION INTRAMUSCULAR; INTRAVENOUS EVERY 5 MIN PRN
Status: DISCONTINUED | OUTPATIENT
Start: 2020-05-13 | End: 2020-05-13 | Stop reason: HOSPADM

## 2020-05-13 RX ORDER — METHOCARBAMOL 750 MG/1
750 TABLET, FILM COATED ORAL
Status: DISCONTINUED | OUTPATIENT
Start: 2020-05-13 | End: 2020-05-13 | Stop reason: HOSPADM

## 2020-05-13 RX ORDER — ONDANSETRON 4 MG/1
4 TABLET, ORALLY DISINTEGRATING ORAL EVERY 30 MIN PRN
Status: DISCONTINUED | OUTPATIENT
Start: 2020-05-13 | End: 2020-05-13 | Stop reason: HOSPADM

## 2020-05-13 RX ORDER — LIDOCAINE 40 MG/G
CREAM TOPICAL
Status: DISCONTINUED | OUTPATIENT
Start: 2020-05-13 | End: 2020-05-13 | Stop reason: HOSPADM

## 2020-05-13 RX ORDER — SODIUM CHLORIDE, SODIUM LACTATE, POTASSIUM CHLORIDE, CALCIUM CHLORIDE 600; 310; 30; 20 MG/100ML; MG/100ML; MG/100ML; MG/100ML
INJECTION, SOLUTION INTRAVENOUS CONTINUOUS
Status: DISCONTINUED | OUTPATIENT
Start: 2020-05-13 | End: 2020-05-13 | Stop reason: HOSPADM

## 2020-05-13 RX ORDER — ONDANSETRON 2 MG/ML
INJECTION INTRAMUSCULAR; INTRAVENOUS PRN
Status: DISCONTINUED | OUTPATIENT
Start: 2020-05-13 | End: 2020-05-13

## 2020-05-13 RX ORDER — GLYCOPYRROLATE 0.2 MG/ML
INJECTION, SOLUTION INTRAMUSCULAR; INTRAVENOUS PRN
Status: DISCONTINUED | OUTPATIENT
Start: 2020-05-13 | End: 2020-05-13

## 2020-05-13 RX ORDER — MEPERIDINE HYDROCHLORIDE 25 MG/ML
12.5 INJECTION INTRAMUSCULAR; INTRAVENOUS; SUBCUTANEOUS
Status: DISCONTINUED | OUTPATIENT
Start: 2020-05-13 | End: 2020-05-13 | Stop reason: HOSPADM

## 2020-05-13 RX ORDER — NEOSTIGMINE METHYLSULFATE 1 MG/ML
VIAL (ML) INJECTION PRN
Status: DISCONTINUED | OUTPATIENT
Start: 2020-05-13 | End: 2020-05-13

## 2020-05-13 RX ORDER — LIDOCAINE HYDROCHLORIDE 40 MG/ML
INJECTION, SOLUTION RETROBULBAR PRN
Status: DISCONTINUED | OUTPATIENT
Start: 2020-05-13 | End: 2020-05-13

## 2020-05-13 RX ORDER — ONDANSETRON 2 MG/ML
4 INJECTION INTRAMUSCULAR; INTRAVENOUS EVERY 30 MIN PRN
Status: DISCONTINUED | OUTPATIENT
Start: 2020-05-13 | End: 2020-05-13 | Stop reason: HOSPADM

## 2020-05-13 RX ORDER — BUPIVACAINE HYDROCHLORIDE AND EPINEPHRINE 2.5; 5 MG/ML; UG/ML
INJECTION, SOLUTION INFILTRATION; PERINEURAL PRN
Status: DISCONTINUED | OUTPATIENT
Start: 2020-05-13 | End: 2020-05-13 | Stop reason: HOSPADM

## 2020-05-13 RX ORDER — EPHEDRINE SULFATE 50 MG/ML
INJECTION, SOLUTION INTRAMUSCULAR; INTRAVENOUS; SUBCUTANEOUS PRN
Status: DISCONTINUED | OUTPATIENT
Start: 2020-05-13 | End: 2020-05-13

## 2020-05-13 RX ORDER — ACETAMINOPHEN 325 MG/1
975 TABLET ORAL ONCE
Status: DISCONTINUED | OUTPATIENT
Start: 2020-05-13 | End: 2020-05-13

## 2020-05-13 RX ORDER — NALOXONE HYDROCHLORIDE 0.4 MG/ML
.1-.4 INJECTION, SOLUTION INTRAMUSCULAR; INTRAVENOUS; SUBCUTANEOUS
Status: DISCONTINUED | OUTPATIENT
Start: 2020-05-13 | End: 2020-05-13 | Stop reason: HOSPADM

## 2020-05-13 RX ORDER — PROPOFOL 10 MG/ML
INJECTION, EMULSION INTRAVENOUS PRN
Status: DISCONTINUED | OUTPATIENT
Start: 2020-05-13 | End: 2020-05-13

## 2020-05-13 RX ORDER — MAGNESIUM HYDROXIDE 1200 MG/15ML
LIQUID ORAL PRN
Status: DISCONTINUED | OUTPATIENT
Start: 2020-05-13 | End: 2020-05-13 | Stop reason: HOSPADM

## 2020-05-13 RX ORDER — ACETAMINOPHEN 325 MG/1
650 TABLET ORAL
Status: DISCONTINUED | OUTPATIENT
Start: 2020-05-13 | End: 2020-05-13 | Stop reason: HOSPADM

## 2020-05-13 RX ORDER — FENTANYL CITRATE 50 UG/ML
25-50 INJECTION, SOLUTION INTRAMUSCULAR; INTRAVENOUS
Status: DISCONTINUED | OUTPATIENT
Start: 2020-05-13 | End: 2020-05-13 | Stop reason: HOSPADM

## 2020-05-13 RX ORDER — LIDOCAINE HYDROCHLORIDE 10 MG/ML
INJECTION, SOLUTION INFILTRATION; PERINEURAL PRN
Status: DISCONTINUED | OUTPATIENT
Start: 2020-05-13 | End: 2020-05-13

## 2020-05-13 RX ORDER — CELECOXIB 200 MG/1
200 CAPSULE ORAL ONCE
Status: COMPLETED | OUTPATIENT
Start: 2020-05-13 | End: 2020-05-13

## 2020-05-13 RX ADMIN — FENTANYL CITRATE 150 MCG: 50 INJECTION, SOLUTION INTRAMUSCULAR; INTRAVENOUS at 07:32

## 2020-05-13 RX ADMIN — SODIUM CHLORIDE, POTASSIUM CHLORIDE, SODIUM LACTATE AND CALCIUM CHLORIDE: 600; 310; 30; 20 INJECTION, SOLUTION INTRAVENOUS at 07:03

## 2020-05-13 RX ADMIN — GLYCOPYRROLATE 0.9 MG: 0.2 INJECTION, SOLUTION INTRAMUSCULAR; INTRAVENOUS at 08:42

## 2020-05-13 RX ADMIN — Medication 5 MG: at 08:12

## 2020-05-13 RX ADMIN — MIDAZOLAM 2 MG: 1 INJECTION INTRAMUSCULAR; INTRAVENOUS at 07:28

## 2020-05-13 RX ADMIN — CEFAZOLIN SODIUM 2 G: 2 INJECTION, SOLUTION INTRAVENOUS at 07:29

## 2020-05-13 RX ADMIN — ACETAMINOPHEN 975 MG: 325 TABLET, FILM COATED ORAL at 07:08

## 2020-05-13 RX ADMIN — Medication 5 MG: at 08:10

## 2020-05-13 RX ADMIN — LIDOCAINE HYDROCHLORIDE 50 MG: 10 INJECTION, SOLUTION INFILTRATION; PERINEURAL at 07:32

## 2020-05-13 RX ADMIN — PROPOFOL 30 MCG/KG/MIN: 10 INJECTION, EMULSION INTRAVENOUS at 07:42

## 2020-05-13 RX ADMIN — ROCURONIUM BROMIDE 50 MG: 10 INJECTION INTRAVENOUS at 07:33

## 2020-05-13 RX ADMIN — HYDROMORPHONE HYDROCHLORIDE 1 MG: 1 INJECTION, SOLUTION INTRAMUSCULAR; INTRAVENOUS; SUBCUTANEOUS at 07:54

## 2020-05-13 RX ADMIN — PROPOFOL 160 MG: 10 INJECTION, EMULSION INTRAVENOUS at 07:32

## 2020-05-13 RX ADMIN — GABAPENTIN 300 MG: 300 CAPSULE ORAL at 07:08

## 2020-05-13 RX ADMIN — CELECOXIB 200 MG: 200 CAPSULE ORAL at 07:08

## 2020-05-13 RX ADMIN — LIDOCAINE HYDROCHLORIDE 4 ML: 40 SOLUTION TOPICAL at 07:35

## 2020-05-13 RX ADMIN — ONDANSETRON HYDROCHLORIDE 4 MG: 2 INJECTION, SOLUTION INTRAVENOUS at 08:38

## 2020-05-13 RX ADMIN — DEXAMETHASONE SODIUM PHOSPHATE 4 MG: 4 INJECTION, SOLUTION INTRA-ARTICULAR; INTRALESIONAL; INTRAMUSCULAR; INTRAVENOUS; SOFT TISSUE at 07:32

## 2020-05-13 RX ADMIN — Medication 5 MG: at 08:43

## 2020-05-13 RX ADMIN — PHENYLEPHRINE HYDROCHLORIDE 100 MCG: 10 INJECTION INTRAVENOUS at 08:14

## 2020-05-13 RX ADMIN — SODIUM CHLORIDE, POTASSIUM CHLORIDE, SODIUM LACTATE AND CALCIUM CHLORIDE: 600; 310; 30; 20 INJECTION, SOLUTION INTRAVENOUS at 08:32

## 2020-05-13 SDOH — HEALTH STABILITY: MENTAL HEALTH: CURRENT SMOKER: 0

## 2020-05-13 ASSESSMENT — MIFFLIN-ST. JEOR: SCORE: 1940.71

## 2020-05-13 NOTE — ANESTHESIA POSTPROCEDURE EVALUATION
Patient: Jeremy Barnett    Procedure(s):  Left Lumbar 5- Sacral 1 minimally invasive microdiscectomy    Diagnosis:Herniation of lumbar intervertebral disc with radiculopathy [M51.16]  Diagnosis Additional Information: No value filed.    Anesthesia Type:  General    Note:  Anesthesia Post Evaluation    Patient location during evaluation: PACU  Patient participation: Able to fully participate in evaluation  Level of consciousness: awake and alert  Pain management: adequate  Airway patency: patent  Cardiovascular status: acceptable  Respiratory status: acceptable  Hydration status: acceptable  PONV: controlled             Last vitals:  Vitals:    05/13/20 0905 05/13/20 0910 05/13/20 0915   BP: 119/78 116/79 125/76   Pulse: 78 63 72   Resp: 18 9 23   Temp:      SpO2: 99% 98% 94%         Electronically Signed By: Ry Cook MD  May 13, 2020  9:32 AM

## 2020-05-13 NOTE — OP NOTE
Procedure Date: 05/13/2020      PREOPERATIVE DIAGNOSES:  Left L5 to S1 herniated disk with radiculopathy.      POSTOPERATIVE DIAGNOSES:  Left L5 to S1 herniated disk with radiculopathy.      PROCEDURE:  Left L5 to S1 minimally invasive microdiskectomy.      SURGEON:  Karl Estrada MD      ASSISTANT:  Reji Sahrma PA-C      ANESTHESIA:  General endotracheal anesthesia plus local anesthetic.      ESTIMATED BLOOD LOSS:  50 mL.      INDICATIONS:  The patient is a 59-year-old male with back and left lower extremity pain and a herniated disk at L5 to S1.  He has failed conservative measures and was brought for microdiskectomy at that level. Please note that Reji Sharma PA-C's assistance was needed for positioning, retraction, suctioning, and closure.      DESCRIPTION OF PROCEDURE:  The patient was brought to the operating room, general endotracheal anesthesia was induced.  The patient was rolled in the prone position on the Danny frame.  The back was prepped in a sterile fashion.  C-arm fluoroscopy was used to localize the L5 to S1 level and a left-sided paramedian incision was made.  The METRx tubular dilating system was placed at the L5 to S1 level and confirmed with fluoroscopy.  The microscope was sterilely draped and brought into the field.  A high-speed drill was used to perform a laminotomy and medial facetectomy and the ligamentum flavum was elevated and resected.  The nerve root was identified and retracted medially and disk fragments particularly consisting of cartilaginous endplate were noted in the area.  These were removed and the nerve root decompressed.  Once this was done and hemostasis was achieved, the wound was infiltrated with local anesthetic.  The fascia was closed with 0 Vicryl, 2-0 inverted interrupted Vicryl was used to close the subcutaneous layer and a 4-0 subcuticular Monocryl was used for skin with Exofin as a dressing.  The patient was then awakened, extubated and taken to  the recovery room in good condition.         RICKY DE MD             D: 2020   T: 2020   MT: KEELY      Name:     CRISTAL BELTRAN   MRN:      [id number]        Account:        [id number]   :      1960           Procedure Date: 2020      Document: [id number]

## 2020-05-13 NOTE — BRIEF OP NOTE
[facility]    Brief Operative Note    Pre-operative diagnosis: Herniation of lumbar intervertebral disc with radiculopathy [M51.16]  Post-operative diagnosis Same as pre-operative diagnosis    Procedure: Procedure(s):  Left Lumbar 5- Sacral 1 minimally invasive microdiscectomy  Surgeon: Surgeon(s) and Role:     * Karl Estrada MD - Primary     * Reji Sharma PA-C - Assisting  Anesthesia: General   Estimated blood loss: 15 mL  Drains: None  Specimens: * No specimens in log *  Findings:   None.  Complications: None.  Implants: * No implants in log *    [id number]

## 2020-05-13 NOTE — ANESTHESIA PREPROCEDURE EVALUATION
Anesthesia Pre-Procedure Evaluation    Patient: Jeremy Barnett   MRN: [id number] : 1960          Preoperative Diagnosis: Herniation of lumbar intervertebral disc with radiculopathy [M51.16]    Procedure(s):  Left Lumbar 5- Sacral 1 minimally invasive microdiscectomy    History reviewed. No pertinent past medical history.  Past Surgical History:   Procedure Laterality Date     COLONOSCOPY       SOFT TISSUE SURGERY      cysts removed     [place] teeth       Anesthesia Evaluation     . Pt has had prior anesthetic. Type: General and MAC    No history of anesthetic complications          ROS/MED HX    ENT/Pulmonary:     (+)allergic rhinitis, , . .    Neurologic:     (+)neuropathy - left leg, foot,     Cardiovascular:  - neg cardiovascular ROS      (-) hypertension, CAD and syncope   METS/Exercise Tolerance:     Hematologic:         Musculoskeletal:   (+) arthritis,  -       GI/Hepatic:  - neg GI/hepatic ROS       Renal/Genitourinary:  - ROS Renal section negative       Endo:  - neg endo ROS       Psychiatric:  - neg psychiatric ROS       Infectious Disease:  - neg infectious disease ROS       Malignancy:         Other:                          Physical Exam  Normal systems: cardiovascular, pulmonary and dental    Airway   Mallampati: II  TM distance: >3 FB  Neck ROM: full    Dental     Cardiovascular       Pulmonary             Lab Results   Component Value Date     2020    POTASSIUM 4.4 2020    CHLORIDE 109 2020    CO2 27 2020    BUN 19 2020    CR 1.18 2020    GLC 99 2020    VALENTIN 8.6 2020       Preop Vitals  BP Readings from Last 3 Encounters:   20 (!) 134/94   20 138/80   20 123/81    Pulse Readings from Last 3 Encounters:   20 76   20 73   20 78      Resp Readings from Last 3 Encounters:   20 18   20 10   20 18    SpO2 Readings from Last 3 Encounters:   20 98%   20 99%   20 97%     "  Temp Readings from Last 1 Encounters:   05/13/20 97.3  F (36.3  C) (Temporal)    Ht Readings from Last 1 Encounters:   05/13/20 1.854 m (6' 1\")      Wt Readings from Last 1 Encounters:   05/13/20 108.2 kg (238 lb 8 oz)    Estimated body mass index is 31.47 kg/m  as calculated from the following:    Height as of this encounter: 1.854 m (6' 1\").    Weight as of this encounter: 108.2 kg (238 lb 8 oz).       Anesthesia Plan      History & Physical Review  History and physical reviewed and following examination; no interval change.    ASA Status:  2 .    NPO Status:  > 8 hours    Plan for General with Intravenous induction. Maintenance will be Balanced.    PONV prophylaxis:  Ondansetron (or other 5HT-3) and Dexamethasone or Solumedrol    The patient is not a current smoker , Patient not instructed to abstain from smoking on day of procedure and patient did not smoke on day of surgery     Postoperative Care  Postoperative pain management:  IV analgesics, Oral pain medications and Multi-modal analgesia.      Consents  Anesthetic plan, risks, benefits and alternatives discussed with:  Patient..                 Ry Cook MD                    .  "

## 2020-05-13 NOTE — DISCHARGE INSTRUCTIONS
GENERAL ANESTHESIA OR SEDATION ADULT DISCHARGE INSTRUCTIONS   SPECIAL PRECAUTIONS FOR 24 HOURS AFTER SURGERY    IT IS NOT UNUSUAL TO FEEL LIGHT-HEADED OR FAINT, UP TO 24 HOURS AFTER SURGERY OR WHILE TAKING PAIN MEDICATION.  IF YOU HAVE THESE SYMPTOMS; SIT FOR A FEW MINUTES BEFORE STANDING AND HAVE SOMEONE ASSIST YOU WHEN YOU GET UP TO WALK OR USE THE BATHROOM.    YOU SHOULD REST AND RELAX FOR THE NEXT 24 HOURS AND YOU MUST MAKE ARRANGEMENTS TO HAVE SOMEONE STAY WITH YOU FOR AT LEAST 24 HOURS AFTER YOUR DISCHARGE.  AVOID HAZARDOUS AND STRENUOUS ACTIVITIES.  DO NOT MAKE IMPORTANT DECISIONS FOR 24 HOURS.    DO NOT DRIVE ANY VEHICLE OR OPERATE MECHANICAL EQUIPMENT FOR 24 HOURS FOLLOWING THE END OF YOUR SURGERY.  EVEN THOUGH YOU MAY FEEL NORMAL, YOUR REACTIONS MAY BE AFFECTED BY THE MEDICATION YOU HAVE RECEIVED.    DO NOT DRINK ALCOHOLIC BEVERAGES FOR 24 HOURS FOLLOWING YOUR SURGERY.    DRINK CLEAR LIQUIDS (APPLE JUICE, GINGER ALE, 7-UP, BROTH, ETC.).  PROGRESS TO YOUR REGULAR DIET AS YOU FEEL ABLE.    YOU MAY HAVE A DRY MOUTH, A SORE THROAT, MUSCLES ACHES OR TROUBLE SLEEPING.  THESE SHOULD GO AWAY AFTER 24 HOURS.    CALL YOUR DOCTOR FOR ANY OF THE FOLLOWING:  SIGNS OF INFECTION (FEVER, GROWING TENDERNESS AT THE SURGERY SITE, A LARGE AMOUNT OF DRAINAGE OR BLEEDING, SEVERE PAIN, FOUL-SMELLING DRAINAGE, REDNESS OR SWELLING.    IT HAS BEEN OVER 8 TO 10 HOURS SINCE SURGERY AND YOU ARE STILL NOT ABLE TO URINATE (PASS WATER).         Maximum acetaminophen (Tylenol) dose from all sources should not exceed 4 grams (4000 mg) per day.  You received 975 mg  At 7:00 am    You received celebrex form of ibuprofen (Motrin) at 7:00 am,  Do not take any ibuprofen products until 1:00 pm

## 2020-05-13 NOTE — ANESTHESIA CARE TRANSFER NOTE
Patient: Jeremy Barnett    Procedure(s):  Left Lumbar 5- Sacral 1 minimally invasive microdiscectomy    Diagnosis: Herniation of lumbar intervertebral disc with radiculopathy [M51.16]  Diagnosis Additional Information: No value filed.    Anesthesia Type:   General     Note:  Airway :Face Mask  Patient transferred to:PACU  Comments:   Spontaneous respirations, oral suctioned, bilateral eye opening and hand grasps.  Extubated to FM O2 6lpm.  VSS to PACU.Handoff Report: Identifed the Patient, Identified the Reponsible Provider, Reviewed the pertinent medical history, Discussed the surgical course, Reviewed Intra-OP anesthesia mangement and issues during anesthesia, Set expectations for post-procedure period and Allowed opportunity for questions and acknowledgement of understanding      Vitals: (Last set prior to Anesthesia Care Transfer)    CRNA VITALS  5/13/2020 0826 - 5/13/2020 0902      5/13/2020             Resp Rate (observed):  18                Electronically Signed By: ESTRELLA Ureña CRNA  May 13, 2020  9:02 AM

## 2020-05-19 ENCOUNTER — DOCUMENTATION ONLY (OUTPATIENT)
Dept: OTHER | Facility: CLINIC | Age: 60
End: 2020-05-19

## 2020-05-26 ENCOUNTER — TRANSFERRED RECORDS (OUTPATIENT)
Dept: HEALTH INFORMATION MANAGEMENT | Facility: CLINIC | Age: 60
End: 2020-05-26

## 2020-05-28 ENCOUNTER — OFFICE VISIT (OUTPATIENT)
Dept: NEUROSURGERY | Facility: CLINIC | Age: 60
End: 2020-05-28
Payer: COMMERCIAL

## 2020-05-28 VITALS
HEIGHT: 73 IN | TEMPERATURE: 98 F | HEART RATE: 67 BPM | BODY MASS INDEX: 31.41 KG/M2 | OXYGEN SATURATION: 99 % | WEIGHT: 237 LBS | DIASTOLIC BLOOD PRESSURE: 85 MMHG | SYSTOLIC BLOOD PRESSURE: 130 MMHG

## 2020-05-28 DIAGNOSIS — Z98.890 S/P LUMBAR MICRODISCECTOMY: Primary | ICD-10-CM

## 2020-05-28 PROCEDURE — 99207 ZZC NO CHARGE NURSE ONLY: CPT

## 2020-05-28 PROCEDURE — 40000269 ZZH STATISTIC NO CHARGE FACILITY FEE

## 2020-05-28 ASSESSMENT — MIFFLIN-ST. JEOR: SCORE: 1933.9

## 2020-05-28 ASSESSMENT — PAIN SCALES - GENERAL: PAINLEVEL: NO PAIN (0)

## 2020-05-28 NOTE — NURSING NOTE
Nursing visit: Incision check    S:  Patient is s/p L5-S1 microdiscecotomy on 5/13/20 with Dr. Estrada. He reports some swelling noted at the incision, but otherwise denies any pain. Reports the left leg pain has improved. His main concern is the continued left foot numbness and mild left foot weakness. He has been going on 20 minute walks daily, but he's wondering if/when he can start increasing his activity. He has weaned off of pain medications.   O:  Incision clean, dry, intact. Healing well without erythema, fluctuation, induration, drainage, or fever. No sutures/staples to be removed today. Patient has 5/5 strength in BLE, but difficulty with toe walk on left foot due to mild left foot weakness.   A: Patient returned to clinic post-op for incision check. Overall recovering well s/p L5-S1 microdiscectomy. Will discuss with Dr. Estrada's care team regarding when to start post op PT for left foot strengthening.  P:    - Keep your incision clean and dry. No bathing, swimming, or submerging in water until incision is well healed.  Call clinic or go to Emergency Room if you develop any new pain, drainage, swelling, or fever.    - Activity restrictions: No lifting greater than 10-15 pounds. Limited bending and twisting.     -Okay to continue with your daily walks as tolerated. May apply ice to help with any pain management.    -Follow up as scheduled on 6/29/20    -Please call clinic with any further questions or concerns: [phone]    Priscila Newsome RN  [facility]  [address] [address]    Tel [phone]

## 2020-05-28 NOTE — PATIENT INSTRUCTIONS
- Keep your incision clean and dry. No bathing, swimming, or submerging in water until incision is well healed.  Call clinic or go to Emergency Room if you develop any new pain, drainage, swelling, or fever.    - Activity restrictions: No lifting greater than 10-15 pounds. Limited bending and twisting.     -Okay to continue with your daily walks as tolerated. May apply ice to help with any pain management.    -Follow up as scheduled on 6/29/20    -Please call clinic with any further questions or concerns: [phone]    Priscila Newsome RN  [facility]  [address] [address]    Tel [phone]

## 2020-05-28 NOTE — PROGRESS NOTES
Nursing visit: Incision check    S:  Patient is s/p L5-S1 microdiscecotomy on 5/13/20 with Dr. Estrada. He reports some swelling noted at the incision, but otherwise denies any pain. Reports the left leg pain has improved. His main concern is the continued left foot numbness and mild left foot weakness. He has been going on 20 minute walks daily, but he's wondering if/when he can start increasing his activity. He has weaned off of pain medications.   O:  Incision clean, dry, intact. Healing well without erythema, fluctuation, induration, drainage, or fever. No sutures/staples to be removed today. Patient has 5/5 strength in BLE, but difficulty with toe walk on left foot due to mild left foot weakness.   A: Patient returned to clinic post-op for incision check. Overall recovering well s/p L5-S1 microdiscectomy. Will discuss with Dr. Estrada's care team regarding when to start post op PT for left foot strengthening.  P:    - Keep your incision clean and dry. No bathing, swimming, or submerging in water until incision is well healed.  Call clinic or go to Emergency Room if you develop any new pain, drainage, swelling, or fever.    - Activity restrictions: No lifting greater than 10-15 pounds. Limited bending and twisting.     -Okay to continue with your daily walks as tolerated. May apply ice to help with any pain management.    -Follow up as scheduled on 6/29/20    -Please call clinic with any further questions or concerns: [phone]    Priscila Newsome RN  [facility]  [address] [address]    Tel [phone]    ADDENDUM: Saturnino ROSARIO approved starting post op PT for strengthening. Order placed. Patient notified.

## 2020-06-01 ENCOUNTER — THERAPY VISIT (OUTPATIENT)
Dept: PHYSICAL THERAPY | Facility: CLINIC | Age: 60
End: 2020-06-01
Attending: PHYSICIAN ASSISTANT
Payer: COMMERCIAL

## 2020-06-01 DIAGNOSIS — Z98.890 S/P LUMBAR MICRODISCECTOMY: ICD-10-CM

## 2020-06-01 DIAGNOSIS — M54.42 BILATERAL LOW BACK PAIN WITH LEFT-SIDED SCIATICA, UNSPECIFIED CHRONICITY: Primary | ICD-10-CM

## 2020-06-01 PROCEDURE — 97161 PT EVAL LOW COMPLEX 20 MIN: CPT | Mod: GP | Performed by: PHYSICAL THERAPIST

## 2020-06-01 PROCEDURE — 97110 THERAPEUTIC EXERCISES: CPT | Mod: GP | Performed by: PHYSICAL THERAPIST

## 2020-06-01 NOTE — PROGRESS NOTES
[facility] Initial Evaluation  Subjective:  The history is provided by the patient. No  was used.   Patient Health History  Jeremy Barnett being seen for PT s/p L5-S1 discectomy.     Problem began: 5/13/2020 (started having back and L LE numbness in Oct 2019).   Problem occurred: unknown cause   Pain is reported as 0/10 on pain scale.  General health as reported by patient is excellent.  Pertinent medical history includes: numbness/tingling.   Red flags:  None as reported by patient.  Medical allergies: none.   Surgeries include:  Orthopedic surgery.    Current medications:  None.    Current occupation is Retired.   Primary job tasks include:  Lifting/carrying, driving and computer work.                  Therapist Generated HPI Evaluation  Problem details: Pt is near 3 weeks s/p L5-S1 microdiscectomy surgery, he reports doing well.  His main c/o before surgery was L foot numbness and L gastroc weakness.  So far these sx's are unchanged but he is aware nerve sx's take time to fully recover..         Type of problem:  Lumbar.      Condition occurred with:  Insidious onset.  Where condition occurred: for unknown reasons.    Pain is described as aching     Since onset symptoms are unchanged.  Associated symptoms:  Loss of strength. Symptoms are exacerbated by walking and certain positions      Previous treatment includes physical therapy. There was none improvement following previous treatment.  Barriers include:  None as reported by patient.                        Objective:    Gait:    Gait Type:  Normal   Weight Bearing Status:  WBAT   Assistive Devices:  None                 Lumbar/SI Evaluation  ROM:  AROM Lumbar: not assessed (good tolerance to prone mid range ARIADNA)      Lumbar Myotomes:        L4 (Ankle DF):  Left:  5    Right:  5  L5 (Great Toe Ext): Left: 4+    Right: 5   S1 (Toe Raise):  Left: 4    Right: 5        Neural Tension/Mobility:  Lumbar:  Normal                                                              General     ROS    Assessment/Plan:    Patient is a 59 year old male with lumbar complaints.    Patient has the following significant findings with corresponding treatment plan.                Diagnosis 1:  S/p L5-S1 microdiscectomy  Pain -  hot/cold therapy, self management, education and home program  Decreased strength - therapeutic exercise and therapeutic activities  Impaired balance - neuro re-education and therapeutic activities  Decreased function - therapeutic activities  Impaired posture - neuro re-education    Previous and current functional limitations:  (See Goal Flow Sheet for this information)    Short term and Long term goals: (See Goal Flow Sheet for this information)     Communication ability:  Patient appears to be able to clearly communicate and understand verbal and written communication and follow directions correctly.  Treatment Explanation - The following has been discussed with the patient:   RX ordered/plan of care  Anticipated outcomes  Possible risks and side effects  This patient would benefit from PT intervention to resume normal activities.   Rehab potential is good.    Frequency:  1 X week, once daily  Duration:  for 4 weeks tapering to 2 X a month over 8 weeks  Discharge Plan:  Achieve all LTG.  Independent in home treatment program.  Reach maximal therapeutic benefit.    Please refer to the daily flowsheet for treatment today, total treatment time and time spent performing 1:1 timed codes.

## 2020-06-08 ENCOUNTER — THERAPY VISIT (OUTPATIENT)
Dept: PHYSICAL THERAPY | Facility: CLINIC | Age: 60
End: 2020-06-08
Payer: COMMERCIAL

## 2020-06-08 DIAGNOSIS — Z98.890 S/P LUMBAR MICRODISCECTOMY: ICD-10-CM

## 2020-06-08 DIAGNOSIS — M54.42 BILATERAL LOW BACK PAIN WITH LEFT-SIDED SCIATICA, UNSPECIFIED CHRONICITY: ICD-10-CM

## 2020-06-08 PROCEDURE — 97110 THERAPEUTIC EXERCISES: CPT | Mod: GP | Performed by: PHYSICAL THERAPIST

## 2020-06-15 ENCOUNTER — THERAPY VISIT (OUTPATIENT)
Dept: PHYSICAL THERAPY | Facility: CLINIC | Age: 60
End: 2020-06-15
Payer: COMMERCIAL

## 2020-06-15 DIAGNOSIS — Z98.890 S/P LUMBAR MICRODISCECTOMY: ICD-10-CM

## 2020-06-15 DIAGNOSIS — M54.42 BILATERAL LOW BACK PAIN WITH LEFT-SIDED SCIATICA, UNSPECIFIED CHRONICITY: ICD-10-CM

## 2020-06-15 PROCEDURE — 97110 THERAPEUTIC EXERCISES: CPT | Mod: GP | Performed by: PHYSICAL THERAPIST

## 2020-06-15 NOTE — PROGRESS NOTES
Subjective:  HPI  Physical Exam                    Objective:  System    Physical Exam    General     ROS    Assessment/Plan:    PROGRESS  REPORT    Progress reporting period is from initial to 6/15/2020.       SUBJECTIVE  Subjective changes noted by patient:  Nabeel returns for her 3rd PT visit.  He is being as active as he can, he is walking but slower around [place] and he is frustrated that he is not moving faster and not progressing with his L foot numbness and weakness . He also reports feeling some R hip and knee pain that limits his walking/hiking trail trips.    Changes in function:  None  Adverse reaction to treatment or activity: None    OBJECTIVE  Changes noted in objective findings:  None  Objective: ok to progress core strength, cont with calf isolation (Unable to perform SL Toe raises on L), ok with DBL LE toe raises and also ok tolerated to shift left toe raises putting a little more weight on his left side. No signs of significant Gastroc/Soleus atrophy on Left.    ASSESSMENT/PLAN  Updated problem list and treatment plan: Diagnosis 1:  S/p L5-S1 microdiscectomy Pain -  hot/cold therapy  Decreased ROM/flexibility - manual therapy and therapeutic exercise  Decreased strength - therapeutic exercise and therapeutic activities  Impaired gait - gait training  Decreased function - therapeutic activities  STG/LTGs have been met or progress has been made towards goals:  Yes (See Goal flow sheet completed today.)  Assessment of Progress: The patient's progress has plateaued.  Self Management Plans:  Patient has been instructed in a home treatment program.  Patient  has been instructed in self management of symptoms.  I have re-evaluated this patient and find that the nature, scope, duration and intensity of the therapy is appropriate for the medical condition of the patient.  Jeremy continues to require the following intervention to meet STG and LTG's:  PT    Recommendations:  Patient will cont with HEP focusing  on core control/strengthening, L LE strengthening and with gradual return to his normal activity level.  Discussed importance to cross train and for him to do some other activity (cycling, swimming etc) versus always walk/hike everyday.  He is retired now and he wants to be able to move and not worry about his pain/weakness.    Please refer to the daily flowsheet for treatment today, total treatment time and time spent performing 1:1 timed codes.

## 2020-06-29 ENCOUNTER — OFFICE VISIT (OUTPATIENT)
Dept: NEUROSURGERY | Facility: CLINIC | Age: 60
End: 2020-06-29
Attending: PHYSICIAN ASSISTANT
Payer: COMMERCIAL

## 2020-06-29 VITALS
OXYGEN SATURATION: 99 % | WEIGHT: 242 LBS | SYSTOLIC BLOOD PRESSURE: 123 MMHG | BODY MASS INDEX: 32.07 KG/M2 | TEMPERATURE: 97.7 F | DIASTOLIC BLOOD PRESSURE: 83 MMHG | HEIGHT: 73 IN | HEART RATE: 66 BPM

## 2020-06-29 DIAGNOSIS — Z98.890 S/P LUMBAR MICRODISCECTOMY: Primary | ICD-10-CM

## 2020-06-29 PROCEDURE — 99024 POSTOP FOLLOW-UP VISIT: CPT | Performed by: PHYSICIAN ASSISTANT

## 2020-06-29 PROCEDURE — G0463 HOSPITAL OUTPT CLINIC VISIT: HCPCS

## 2020-06-29 ASSESSMENT — PAIN SCALES - GENERAL: PAINLEVEL: NO PAIN (0)

## 2020-06-29 ASSESSMENT — MIFFLIN-ST. JEOR: SCORE: 1956.58

## 2020-06-29 NOTE — PROGRESS NOTES
"NEUROSURGERY CLINIC PROGRESS NOTE    DATE OF VISIT: 6/29/2020    HPI:     Jeremy Barnett is a pleasant 59 year old male who presents to the clinic today for a 6-week post-operative follow-up visit. On 05/13/2020 the patient underwent a left L5 to S1 minimally invasive microdiskectomy with Dr. Estrada for a left L5 to S1 herniated disk with radiculopathy. Per chart review, the patient's pre-operative symptoms consisted of a 5 month history of left lower leg and foot numbness and weakness, specifically PF.  Today, the patient reports that he is doing well, although he is frustrated that he continues to have PF weakness. He does have full active range of motion, but he cannot lift his body weight with PF. He also remains with diminished sensation.     Current Outpatient Medications   Medication     fexofenadine (ALLEGRA) 180 MG tablet     methocarbamol (ROBAXIN) 500 MG tablet     oxyCODONE (ROXICODONE) 5 MG tablet     senna-docusate (SENOKOT-S/PERICOLACE) 8.6-50 MG tablet     No current facility-administered medications for this visit.        No Known Allergies    No past medical history on file.    Review Of Systems    Skin: negative  Eyes: negative  Ears/Nose/Throat: negative  Respiratory: No shortness of breath, dyspnea on exertion, cough, or hemoptysis  Cardiovascular: negative  Gastrointestinal: negative  Musculoskeletal: left PF muscular weakness  Neurologic: Left LE numbness or tingling of feet  Psychiatric: negative  Hematologic/Lymphatic/Immunologic: negative  Endocrine: negative    OBJECTIVE:    /83 (BP Location: Right arm, Patient Position: Sitting, Cuff Size: Adult Large)   Pulse 66   Temp 97.7  F (36.5  C) (Oral)   Ht 6' 1\" (1.854 m)   Wt 242 lb (109.8 kg)   SpO2 99%   BMI 31.93 kg/m      Exam:    Patient appears comfortable and in no apparent distress. Moving all extremities.  Gait is non-antalgic.  CN II-XII grossly intact, alert and appropriate with conversation and " following  commands  Bilateral upper extremities with full strength including hand intrinsics and grasp.  Sensation intact throughout.  Bilateral lower extremities 5/5 strength including plantar and dorsiflexion. Left PF weakness 4/5  Diminished sensation throughout LLE.  Lumbar incision edges well approximated. Absent for edema, erythema, or drainage.    PLAN:    Jeremy Barnett is 6 weeks out from a  left L5 to S1 minimally invasive microdiskectomy performed by Dr. Estrada on 5/13. Today the patient reports  that he is doing well, although he is frustrated that he continues to have PF weakness. He does have full active range of motion, but he cannot lift his body weight with PF. He also remains with diminished sensation.    The patient has remained compliant with the post-operative restrictions which included not lifting anything greater than 5-10 lbs. Today we discussed increasing activity from 10 lbs by 2-5 lbs per week, but encouraged continuing to avoid excessive bending,  twisting, and turning at the waist and to avoid jostling and jarring activities. He will focus on low impact cardiovascular exercise such as recumbent biking. He is participating in physical therapy/HEP. We have explained that his strength and sensation can take up to 18 months to return, and there is a possibility that it does not return since this deficit was present for 5 months preoperatively.   Mr. Barnett will return to the clinic in 6 weeks.    The patient gave verbal understanding and is in agreement with the above plan. He will call or return to the clinic for any worsening or changes in symptoms.    Respectfully,     ANGE Galicia PA-C      Reviewed the above information and findings with Dr. Estrada.

## 2020-06-29 NOTE — LETTER
"    6/29/2020         RE: Jeremy Barnett  [address]  [place] [address]        Dear Colleague,    Thank you for referring your patient, Jeremy Barnett, to the [facility]. Please see a copy of my visit note below.    NEUROSURGERY CLINIC PROGRESS NOTE    DATE OF VISIT: 6/29/2020    HPI:     Jeremy Barnett is a pleasant 59 year old male who presents to the clinic today for a 6-week post-operative follow-up visit. On 05/13/2020 the patient underwent a left L5 to S1 minimally invasive microdiskectomy with Dr. Estrada for a left L5 to S1 herniated disk with radiculopathy. Per chart review, the patient's pre-operative symptoms consisted of a 5 month history of left lower leg and foot numbness and weakness, specifically PF.  Today, the patient reports that he is doing well, although he is frustrated that he continues to have PF weakness. He does have full active range of motion, but he cannot lift his body weight with PF. He also remains with diminished sensation.     Current Outpatient Medications   Medication     fexofenadine (ALLEGRA) 180 MG tablet     methocarbamol (ROBAXIN) 500 MG tablet     oxyCODONE (ROXICODONE) 5 MG tablet     senna-docusate (SENOKOT-S/PERICOLACE) 8.6-50 MG tablet     No current facility-administered medications for this visit.        No Known Allergies    No past medical history on file.    Review Of Systems    Skin: negative  Eyes: negative  Ears/Nose/Throat: negative  Respiratory: No shortness of breath, dyspnea on exertion, cough, or hemoptysis  Cardiovascular: negative  Gastrointestinal: negative  Musculoskeletal: left PF muscular weakness  Neurologic: Left LE numbness or tingling of feet  Psychiatric: negative  Hematologic/Lymphatic/Immunologic: negative  Endocrine: negative    OBJECTIVE:    /83 (BP Location: Right arm, Patient Position: Sitting, Cuff Size: Adult Large)   Pulse 66   Temp 97.7  F (36.5  C) (Oral)   Ht 6' 1\" (1.854 m)   Wt 242 lb (109.8 kg)   " SpO2 99%   BMI 31.93 kg/m      Exam:    Patient appears comfortable and in no apparent distress. Moving all extremities.  Gait is non-antalgic.  CN II-XII grossly intact, alert and appropriate with conversation and following  commands  Bilateral upper extremities with full strength including hand intrinsics and grasp.  Sensation intact throughout.  Bilateral lower extremities 5/5 strength including plantar and dorsiflexion. Left PF weakness 4/5  Diminished sensation throughout LLE.  Lumbar incision edges well approximated. Absent for edema, erythema, or drainage.    PLAN:    Jeremy Barnett is 6 weeks out from a  left L5 to S1 minimally invasive microdiskectomy performed by Dr. Estrada on 5/13. Today the patient reports  that he is doing well, although he is frustrated that he continues to have PF weakness. He does have full active range of motion, but he cannot lift his body weight with PF. He also remains with diminished sensation.    The patient has remained compliant with the post-operative restrictions which included not lifting anything greater than 5-10 lbs. Today we discussed increasing activity from 10 lbs by 2-5 lbs per week, but encouraged continuing to avoid excessive bending,  twisting, and turning at the waist and to avoid jostling and jarring activities. He will focus on low impact cardiovascular exercise such as recumbent biking. He is participating in physical therapy/HEP. We have explained that his strength and sensation can take up to 18 months to return, and there is a possibility that it does not return since this deficit was present for 5 months preoperatively.   Mr. Barnett will return to the clinic in 6 weeks.    The patient gave verbal understanding and is in agreement with the above plan. He will call or return to the clinic for any worsening or changes in symptoms.    Respectfully,     ANGE Galicia, PA-C      Reviewed the above information and findings with Dr. Estrada.    Again, thank you  for allowing me to participate in the care of your patient.        Sincerely,        Reji Sharma PA-C

## 2020-06-29 NOTE — NURSING NOTE
"Jeremy Barnett is a 59 year old male who presents for:  Chief Complaint   Patient presents with     Neurologic Problem     6wk post op. Left lumbar 5 to s1. min invasive micro. discectomy        Initial Vitals:  /83 (BP Location: Right arm, Patient Position: Sitting, Cuff Size: Adult Large)   Pulse 66   Temp 97.7  F (36.5  C) (Oral)   Ht 6' 1\" (1.854 m)   Wt 242 lb (109.8 kg)   SpO2 99%   BMI 31.93 kg/m   Estimated body mass index is 31.93 kg/m  as calculated from the following:    Height as of this encounter: 6' 1\" (1.854 m).    Weight as of this encounter: 242 lb (109.8 kg).. Body surface area is 2.38 meters squared. BP completed using cuff size: regular  No Pain (0)    Nursing Comments: see chief complaint    Beka Hudson, CMA    "

## 2020-08-10 ENCOUNTER — OFFICE VISIT (OUTPATIENT)
Dept: NEUROSURGERY | Facility: CLINIC | Age: 60
End: 2020-08-10
Attending: NEUROLOGICAL SURGERY
Payer: COMMERCIAL

## 2020-08-10 VITALS
WEIGHT: 242 LBS | DIASTOLIC BLOOD PRESSURE: 88 MMHG | HEIGHT: 73 IN | OXYGEN SATURATION: 96 % | RESPIRATION RATE: 16 BRPM | HEART RATE: 64 BPM | BODY MASS INDEX: 32.07 KG/M2 | SYSTOLIC BLOOD PRESSURE: 133 MMHG

## 2020-08-10 DIAGNOSIS — M51.16 LUMBAR DISC HERNIATION WITH RADICULOPATHY: Primary | ICD-10-CM

## 2020-08-10 PROCEDURE — 99024 POSTOP FOLLOW-UP VISIT: CPT | Performed by: NEUROLOGICAL SURGERY

## 2020-08-10 PROCEDURE — G0463 HOSPITAL OUTPT CLINIC VISIT: HCPCS

## 2020-08-10 ASSESSMENT — PAIN SCALES - GENERAL: PAINLEVEL: NO PAIN (0)

## 2020-08-10 ASSESSMENT — MIFFLIN-ST. JEOR: SCORE: 1956.58

## 2020-08-10 NOTE — LETTER
"    8/10/2020         RE: Jeremy Barnett  [address] [place] [address]        Dear Colleague,    Thank you for referring your patient, Jeremy Barnett, to the [facility]. Please see a copy of my visit note below.    It was a pleasure to see Jeremy Barnett today in Neurosurgery Clinic. He is a 59 year old male who underwent:    Procedure Date: 05/13/2020      PREOPERATIVE DIAGNOSES:  Left L5 to S1 herniated disk with radiculopathy.      POSTOPERATIVE DIAGNOSES:  Left L5 to S1 herniated disk with radiculopathy.      PROCEDURE:  Left L5 to S1 minimally invasive microdiskectomy.      SURGEON:  Karl Estrada MD      ASSISTANT:  Reji Sharma PA-C      ANESTHESIA:  General endotracheal anesthesia plus local anesthetic.      ESTIMATED BLOOD LOSS:  50 mL.     He has no surgical pain. However he has not had a lot of improvement in his L APF weakness or numbness. He has been doing PT.       Vitals:    08/10/20 1106   BP: 133/88   Pulse: 64   Resp: 16   SpO2: 96%   Weight: 109.8 kg (242 lb)   Height: 1.854 m (6' 1\")     Body mass index is 31.93 kg/m .  No Pain (0)    Well healed lumbar incision.  BLE strength 5/5 except unable to raise body weight with L APF.    Imaging: No new imaging.    Assessment: s/p lumbar discectomy    Plan: Given his lack of significant improvement, I will repeat a MRI and we will contact him with the results.             Again, thank you for allowing me to participate in the care of your patient.        Sincerely,        Karl Estrada MD    "

## 2020-08-10 NOTE — NURSING NOTE
"Jeremy Barnett is a 59 year old male who presents for:  Chief Complaint   Patient presents with     Follow Up        Initial Vitals:  /88   Pulse 64   Resp 16   Ht 6' 1\" (1.854 m)   Wt 242 lb (109.8 kg)   SpO2 96%   BMI 31.93 kg/m   Estimated body mass index is 31.93 kg/m  as calculated from the following:    Height as of this encounter: 6' 1\" (1.854 m).    Weight as of this encounter: 242 lb (109.8 kg).. Body surface area is 2.38 meters squared. BP completed using cuff size: large  No Pain (0)    Nursing Comments: Pt present today for 12 weeks post op follow up.    Vadim Gonsalez, [facility]    "

## 2020-08-10 NOTE — PATIENT INSTRUCTIONS
- Order placed for Lumbar MRI with and without contrast imaging. You can call to schedule this at [facility] imaging as requested. We will call you with the results and next steps once imaging is completed.        [facility]  Phone: [phone]  Fax: [phone]

## 2020-08-10 NOTE — PROGRESS NOTES
"It was a pleasure to see Jeremy Barnett today in Neurosurgery Clinic. He is a 59 year old male who underwent:    Procedure Date: 05/13/2020      PREOPERATIVE DIAGNOSES:  Left L5 to S1 herniated disk with radiculopathy.      POSTOPERATIVE DIAGNOSES:  Left L5 to S1 herniated disk with radiculopathy.      PROCEDURE:  Left L5 to S1 minimally invasive microdiskectomy.      SURGEON:  Karl Estrada MD      ASSISTANT:  Reji Sharma PA-C      ANESTHESIA:  General endotracheal anesthesia plus local anesthetic.      ESTIMATED BLOOD LOSS:  50 mL.     He has no surgical pain. However he has not had a lot of improvement in his L APF weakness or numbness. He has been doing PT.       Vitals:    08/10/20 1106   BP: 133/88   Pulse: 64   Resp: 16   SpO2: 96%   Weight: 109.8 kg (242 lb)   Height: 1.854 m (6' 1\")     Body mass index is 31.93 kg/m .  No Pain (0)    Well healed lumbar incision.  BLE strength 5/5 except unable to raise body weight with L APF.    Imaging: No new imaging.    Assessment: s/p lumbar discectomy    Plan: Given his lack of significant improvement, I will repeat a MRI and we will contact him with the results.         "

## 2020-08-14 ENCOUNTER — TRANSFERRED RECORDS (OUTPATIENT)
Dept: HEALTH INFORMATION MANAGEMENT | Facility: CLINIC | Age: 60
End: 2020-08-14

## 2020-08-18 ENCOUNTER — TELEPHONE (OUTPATIENT)
Dept: NEUROSURGERY | Facility: CLINIC | Age: 60
End: 2020-08-18

## 2020-08-18 NOTE — TELEPHONE ENCOUNTER
"Dr. Estrada reviewed Lumbar MRI and stated, \"It looks like there is not any residual disc or other problem. There is nothing else for us to do at this time.\"    Calling patient to relay. Patient reports his foot continues to be numb and difficult to lift up foot, which he demonstrated to Dr. Estrada at [facility] on 08/10. He is inquiring if there is anything else he should be doing to improve his function. He has exercises PT gave to him that he is doing at home.. Nursing will discuss with Dr. Estrada.   "

## 2020-08-19 NOTE — TELEPHONE ENCOUNTER
"Per Dr. Estrada, \"It is still early, but may become his new baseline.\" Informed patient of this and he verbalized understanding. He will give it another 6-8 weeks to see if it will improve and will call clinic to follow-up if symptoms persist.   "

## 2020-10-06 ENCOUNTER — TRANSFERRED RECORDS (OUTPATIENT)
Dept: HEALTH INFORMATION MANAGEMENT | Facility: CLINIC | Age: 60
End: 2020-10-06

## 2020-10-09 ENCOUNTER — TELEPHONE (OUTPATIENT)
Dept: NEUROSURGERY | Facility: CLINIC | Age: 60
End: 2020-10-09

## 2020-10-09 NOTE — TELEPHONE ENCOUNTER
Patient called and LM on RN line. He is s/p Left L5 to S1 minimally invasive microdiskectomy on 5/13/20. He states he has had no improvement in strength.     Returned call, spoke to patient. Patient is continuing to have a weak and numb left foot. . After walking longer distances patient is limping. Patient is a very acvtive person and is unable to do the activities he loves.He expressed frustration. He feels no change after having surgery but no worse. Denies any pain. States he can't stride on the left foot becaue he can't lift the left heel off the ground. No new symptoms reported, same symptoms as before. He feels like surgery did not resolve or correct anything. Patient worked with PT after surgery and doing exercises daily. Nursing will discuss with Dr Estrada.

## 2020-10-12 NOTE — TELEPHONE ENCOUNTER
Per Dr. Estrada, he would be happy to see the patient. He did not see any real residual problems on MRI.  Relayed to patient. Patient would like to see Dr. Estrada.  Scheduled for 10/19.

## 2020-10-19 ENCOUNTER — OFFICE VISIT (OUTPATIENT)
Dept: NEUROSURGERY | Facility: CLINIC | Age: 60
End: 2020-10-19
Attending: NEUROLOGICAL SURGERY
Payer: COMMERCIAL

## 2020-10-19 VITALS
HEIGHT: 73 IN | HEART RATE: 64 BPM | OXYGEN SATURATION: 99 % | SYSTOLIC BLOOD PRESSURE: 143 MMHG | TEMPERATURE: 97.3 F | BODY MASS INDEX: 32.44 KG/M2 | WEIGHT: 244.8 LBS | DIASTOLIC BLOOD PRESSURE: 87 MMHG

## 2020-10-19 DIAGNOSIS — M51.16 HERNIATION OF LUMBAR INTERVERTEBRAL DISC WITH RADICULOPATHY: Primary | ICD-10-CM

## 2020-10-19 PROCEDURE — 99213 OFFICE O/P EST LOW 20 MIN: CPT | Performed by: NEUROLOGICAL SURGERY

## 2020-10-19 PROCEDURE — G0463 HOSPITAL OUTPT CLINIC VISIT: HCPCS

## 2020-10-19 RX ORDER — CETIRIZINE HYDROCHLORIDE 5 MG/1
5 TABLET ORAL DAILY
COMMUNITY
End: 2023-01-18

## 2020-10-19 ASSESSMENT — PAIN SCALES - GENERAL: PAINLEVEL: NO PAIN (0)

## 2020-10-19 ASSESSMENT — MIFFLIN-ST. JEOR: SCORE: 1969.29

## 2020-10-19 NOTE — NURSING NOTE
"Jeremy Barnett is a 59 year old male who presents for:  Chief Complaint   Patient presents with     Neurologic Problem     Discuss MRI Lumbar        Initial Vitals:  BP (!) 143/87 (BP Location: Right arm, Patient Position: Sitting, Cuff Size: Adult Large)   Pulse 64   Temp 97.3  F (36.3  C) (Oral)   Ht 6' 1\" (1.854 m)   Wt 244 lb 12.8 oz (111 kg)   SpO2 99%   BMI 32.30 kg/m   Estimated body mass index is 32.3 kg/m  as calculated from the following:    Height as of this encounter: 6' 1\" (1.854 m).    Weight as of this encounter: 244 lb 12.8 oz (111 kg).. Body surface area is 2.39 meters squared. BP completed using cuff size: large  No Pain (0)    Nursing Comments: left foot has numbness and weakness.     Beka Hudson, [facility]    "

## 2020-10-19 NOTE — LETTER
"    10/19/2020         RE: Jeremy Barnett  [address] [place] [address]        Dear Colleague,    Thank you for referring your patient, Jeremy Barnett, to the [facility]UROSURGERY [facility] [place]. Please see a copy of my visit note below.    It was a pleasure to see Jeremy Barnett today in Neurosurgery Clinic. He is a 59 year old male who underwent:    Procedure Date: 05/13/2020      PREOPERATIVE DIAGNOSES:  Left L5 to S1 herniated disk with radiculopathy.      POSTOPERATIVE DIAGNOSES:  Left L5 to S1 herniated disk with radiculopathy.      PROCEDURE:  Left L5 to S1 minimally invasive microdiskectomy.      SURGEON:  Karl Estrada MD      ASSISTANT:  Reji Sharma PA-C      ANESTHESIA:  General endotracheal anesthesia plus local anesthetic.      ESTIMATED BLOOD LOSS:  50 mL.      He continues to have left foot plantar flexion weakness and numbness in the bottom of the foot and toes.  This is been stable since surgery.    Vitals:    10/19/20 0925   BP: (!) 143/87   BP Location: Right arm   Patient Position: Sitting   Cuff Size: Adult Large   Pulse: 64   Temp: 97.3  F (36.3  C)   TempSrc: Oral   SpO2: 99%   Weight: 111 kg (244 lb 12.8 oz)   Height: 1.854 m (6' 1\")     Body mass index is 32.3 kg/m .  No Pain (0)    He is stable neurologically with 4/5 L APF. He cannot raise his body weight on that foot alone.    Imaging: Postoperative MRI shows no residual problems, with resection of the disc hernation.    Assessment: Residual weakness after microdiscectomy.    Plan: We will discuss treatment plans for him with PT to see if they have anything to offer that might help him improve.  We will let him know the results of the discussion.         Again, thank you for allowing me to participate in the care of your patient.        Sincerely,        Karl Estrada MD    "

## 2020-10-19 NOTE — PROGRESS NOTES
"It was a pleasure to see Jeremy Banrett today in Neurosurgery Clinic. He is a 59 year old male who underwent:    Procedure Date: 05/13/2020      PREOPERATIVE DIAGNOSES:  Left L5 to S1 herniated disk with radiculopathy.      POSTOPERATIVE DIAGNOSES:  Left L5 to S1 herniated disk with radiculopathy.      PROCEDURE:  Left L5 to S1 minimally invasive microdiskectomy.      SURGEON:  Karl Estrada MD      ASSISTANT:  Reji Sharma PA-C      ANESTHESIA:  General endotracheal anesthesia plus local anesthetic.      ESTIMATED BLOOD LOSS:  50 mL.      He continues to have left foot plantar flexion weakness and numbness in the bottom of the foot and toes.  This is been stable since surgery.    Vitals:    10/19/20 0925   BP: (!) 143/87   BP Location: Right arm   Patient Position: Sitting   Cuff Size: Adult Large   Pulse: 64   Temp: 97.3  F (36.3  C)   TempSrc: Oral   SpO2: 99%   Weight: 111 kg (244 lb 12.8 oz)   Height: 1.854 m (6' 1\")     Body mass index is 32.3 kg/m .  No Pain (0)    He is stable neurologically with 4/5 L APF. He cannot raise his body weight on that foot alone.    Imaging: Postoperative MRI shows no residual problems, with resection of the disc hernation.    Assessment: Residual weakness after microdiscectomy.    Plan: We will discuss treatment plans for him with PT to see if they have anything to offer that might help him improve.  We will let him know the results of the discussion.     "

## 2020-11-23 PROBLEM — Z98.890 S/P LUMBAR MICRODISCECTOMY: Status: RESOLVED | Noted: 2020-06-01 | Resolved: 2020-11-23

## 2020-11-23 PROBLEM — M54.42 BILATERAL LOW BACK PAIN WITH LEFT-SIDED SCIATICA, UNSPECIFIED CHRONICITY: Status: RESOLVED | Noted: 2020-06-01 | Resolved: 2020-11-23

## 2020-11-30 ENCOUNTER — TELEPHONE (OUTPATIENT)
Dept: NEUROSURGERY | Facility: CLINIC | Age: 60
End: 2020-11-30

## 2020-11-30 NOTE — TELEPHONE ENCOUNTER
Patient is calling to inquire about a discussion that  was supposed to have with Physical Therapy to see if they have anything to offer that might help him improve, patient never heard back, he would like a call back at [phone].

## 2020-12-02 NOTE — TELEPHONE ENCOUNTER
"Routing message to Dr Estrada per Zachary :\" May want to send this to Dr. Estrada as he had the conversation. \"  "

## 2020-12-10 ENCOUNTER — TELEPHONE (OUTPATIENT)
Dept: NEUROSURGERY | Facility: CLINIC | Age: 60
End: 2020-12-10

## 2020-12-10 NOTE — TELEPHONE ENCOUNTER
Pt wanting to speak with Dr. Estrada - patient states he has had zero progress since surgery in Oct. Pt has done all required therapy with no improvement. Patient wanting a call back asap with options.

## 2020-12-11 NOTE — TELEPHONE ENCOUNTER
Per Zachary: I called and spoke to him. He was actually wanting to hear back from Dr. Estrada. Apparently Dr. Estrada was going to discuss options with physical therapy regarding his residual weakness after the left L5-S1 microdiscectomy on 5/13/20.  I will pass this along to Dr. Estrada as well.

## 2021-05-08 ENCOUNTER — HEALTH MAINTENANCE LETTER (OUTPATIENT)
Age: 61
End: 2021-05-08

## 2021-10-23 ENCOUNTER — HEALTH MAINTENANCE LETTER (OUTPATIENT)
Age: 61
End: 2021-10-23

## 2022-06-04 ENCOUNTER — HEALTH MAINTENANCE LETTER (OUTPATIENT)
Age: 62
End: 2022-06-04

## 2022-10-09 ENCOUNTER — HEALTH MAINTENANCE LETTER (OUTPATIENT)
Age: 62
End: 2022-10-09

## 2023-01-11 SDOH — ECONOMIC STABILITY: FOOD INSECURITY: WITHIN THE PAST 12 MONTHS, YOU WORRIED THAT YOUR FOOD WOULD RUN OUT BEFORE YOU GOT MONEY TO BUY MORE.: NEVER TRUE

## 2023-01-11 SDOH — ECONOMIC STABILITY: INCOME INSECURITY: IN THE LAST 12 MONTHS, WAS THERE A TIME WHEN YOU WERE NOT ABLE TO PAY THE MORTGAGE OR RENT ON TIME?: NO

## 2023-01-11 SDOH — ECONOMIC STABILITY: INCOME INSECURITY: HOW HARD IS IT FOR YOU TO PAY FOR THE VERY BASICS LIKE FOOD, HOUSING, MEDICAL CARE, AND HEATING?: NOT HARD AT ALL

## 2023-01-11 SDOH — HEALTH STABILITY: PHYSICAL HEALTH: ON AVERAGE, HOW MANY MINUTES DO YOU ENGAGE IN EXERCISE AT THIS LEVEL?: 120 MIN

## 2023-01-11 SDOH — ECONOMIC STABILITY: TRANSPORTATION INSECURITY
IN THE PAST 12 MONTHS, HAS THE LACK OF TRANSPORTATION KEPT YOU FROM MEDICAL APPOINTMENTS OR FROM GETTING MEDICATIONS?: NO

## 2023-01-11 SDOH — ECONOMIC STABILITY: FOOD INSECURITY: WITHIN THE PAST 12 MONTHS, THE FOOD YOU BOUGHT JUST DIDN'T LAST AND YOU DIDN'T HAVE MONEY TO GET MORE.: NEVER TRUE

## 2023-01-11 SDOH — HEALTH STABILITY: PHYSICAL HEALTH: ON AVERAGE, HOW MANY DAYS PER WEEK DO YOU ENGAGE IN MODERATE TO STRENUOUS EXERCISE (LIKE A BRISK WALK)?: 7 DAYS

## 2023-01-11 SDOH — ECONOMIC STABILITY: TRANSPORTATION INSECURITY
IN THE PAST 12 MONTHS, HAS LACK OF TRANSPORTATION KEPT YOU FROM MEETINGS, WORK, OR FROM GETTING THINGS NEEDED FOR DAILY LIVING?: NO

## 2023-01-11 ASSESSMENT — ENCOUNTER SYMPTOMS
HEMATURIA: 0
COUGH: 0
NERVOUS/ANXIOUS: 0
DYSURIA: 0
PARESTHESIAS: 0
DIARRHEA: 0
ARTHRALGIAS: 0
FREQUENCY: 0
SORE THROAT: 0
MYALGIAS: 0
CONSTIPATION: 0
EYE PAIN: 0
FEVER: 0
ABDOMINAL PAIN: 0
PALPITATIONS: 0
WEAKNESS: 0
JOINT SWELLING: 0
HEADACHES: 0
DIZZINESS: 0
HEARTBURN: 0
SHORTNESS OF BREATH: 0
HEMATOCHEZIA: 0
NAUSEA: 0
CHILLS: 0

## 2023-01-11 ASSESSMENT — LIFESTYLE VARIABLES
HOW OFTEN DO YOU HAVE SIX OR MORE DRINKS ON ONE OCCASION: NEVER
SKIP TO QUESTIONS 9-10: 1
AUDIT-C TOTAL SCORE: 1
HOW MANY STANDARD DRINKS CONTAINING ALCOHOL DO YOU HAVE ON A TYPICAL DAY: PATIENT DOES NOT DRINK
HOW OFTEN DO YOU HAVE A DRINK CONTAINING ALCOHOL: MONTHLY OR LESS

## 2023-01-11 ASSESSMENT — SOCIAL DETERMINANTS OF HEALTH (SDOH)
HOW OFTEN DO YOU GET TOGETHER WITH FRIENDS OR RELATIVES?: THREE TIMES A WEEK
DO YOU BELONG TO ANY CLUBS OR ORGANIZATIONS SUCH AS CHURCH GROUPS UNIONS, FRATERNAL OR ATHLETIC GROUPS, OR SCHOOL GROUPS?: NO
IN A TYPICAL WEEK, HOW MANY TIMES DO YOU TALK ON THE PHONE WITH FAMILY, FRIENDS, OR NEIGHBORS?: MORE THAN THREE TIMES A WEEK
HOW OFTEN DO YOU ATTEND CHURCH OR RELIGIOUS SERVICES?: PATIENT DECLINED

## 2023-01-18 ENCOUNTER — OFFICE VISIT (OUTPATIENT)
Dept: PEDIATRICS | Facility: CLINIC | Age: 63
End: 2023-01-18
Payer: COMMERCIAL

## 2023-01-18 VITALS
RESPIRATION RATE: 16 BRPM | HEIGHT: 73 IN | SYSTOLIC BLOOD PRESSURE: 128 MMHG | HEART RATE: 64 BPM | TEMPERATURE: 97.4 F | OXYGEN SATURATION: 98 % | BODY MASS INDEX: 31.85 KG/M2 | WEIGHT: 240.3 LBS | DIASTOLIC BLOOD PRESSURE: 80 MMHG

## 2023-01-18 DIAGNOSIS — Z12.11 COLON CANCER SCREENING: ICD-10-CM

## 2023-01-18 DIAGNOSIS — D12.6 ADENOMATOUS POLYP OF COLON, UNSPECIFIED PART OF COLON: ICD-10-CM

## 2023-01-18 DIAGNOSIS — E66.811 CLASS 1 OBESITY WITHOUT SERIOUS COMORBIDITY WITH BODY MASS INDEX (BMI) OF 31.0 TO 31.9 IN ADULT, UNSPECIFIED OBESITY TYPE: ICD-10-CM

## 2023-01-18 DIAGNOSIS — Z12.5 SCREENING FOR PROSTATE CANCER: ICD-10-CM

## 2023-01-18 DIAGNOSIS — Z00.00 ROUTINE GENERAL MEDICAL EXAMINATION AT A HEALTH CARE FACILITY: Primary | ICD-10-CM

## 2023-01-18 LAB
ALBUMIN SERPL BCG-MCNC: 4.2 G/DL (ref 3.5–5.2)
ALP SERPL-CCNC: 122 U/L (ref 40–129)
ALT SERPL W P-5'-P-CCNC: 22 U/L (ref 10–50)
ANION GAP SERPL CALCULATED.3IONS-SCNC: 14 MMOL/L (ref 7–15)
AST SERPL W P-5'-P-CCNC: 27 U/L (ref 10–50)
BILIRUB SERPL-MCNC: 0.3 MG/DL
BUN SERPL-MCNC: 20.2 MG/DL (ref 8–23)
CALCIUM SERPL-MCNC: 9.1 MG/DL (ref 8.8–10.2)
CHLORIDE SERPL-SCNC: 105 MMOL/L (ref 98–107)
CHOLEST SERPL-MCNC: 167 MG/DL
CREAT SERPL-MCNC: 1.14 MG/DL (ref 0.67–1.17)
DEPRECATED HCO3 PLAS-SCNC: 22 MMOL/L (ref 22–29)
GFR SERPL CREATININE-BSD FRML MDRD: 72 ML/MIN/1.73M2
GLUCOSE SERPL-MCNC: 83 MG/DL (ref 70–99)
HDLC SERPL-MCNC: 66 MG/DL
LDLC SERPL CALC-MCNC: 96 MG/DL
NONHDLC SERPL-MCNC: 101 MG/DL
POTASSIUM SERPL-SCNC: 4.9 MMOL/L (ref 3.4–5.3)
PROT SERPL-MCNC: 6.6 G/DL (ref 6.4–8.3)
PSA SERPL-MCNC: 1.32 NG/ML (ref 0–4.5)
SODIUM SERPL-SCNC: 141 MMOL/L (ref 136–145)
TRIGL SERPL-MCNC: 27 MG/DL

## 2023-01-18 PROCEDURE — 80053 COMPREHEN METABOLIC PANEL: CPT | Performed by: NURSE PRACTITIONER

## 2023-01-18 PROCEDURE — 99396 PREV VISIT EST AGE 40-64: CPT | Performed by: NURSE PRACTITIONER

## 2023-01-18 PROCEDURE — 36415 COLL VENOUS BLD VENIPUNCTURE: CPT | Performed by: NURSE PRACTITIONER

## 2023-01-18 PROCEDURE — 80061 LIPID PANEL: CPT | Performed by: NURSE PRACTITIONER

## 2023-01-18 PROCEDURE — G0103 PSA SCREENING: HCPCS | Performed by: NURSE PRACTITIONER

## 2023-01-18 ASSESSMENT — ENCOUNTER SYMPTOMS
MYALGIAS: 0
EYE PAIN: 0
FEVER: 0
PARESTHESIAS: 0
NAUSEA: 0
DYSURIA: 0
DIZZINESS: 0
HEARTBURN: 0
COUGH: 0
SHORTNESS OF BREATH: 0
CHILLS: 0
WEAKNESS: 0
DIARRHEA: 0
HEADACHES: 0
HEMATURIA: 0
ABDOMINAL PAIN: 0
JOINT SWELLING: 0
NERVOUS/ANXIOUS: 0
CONSTIPATION: 0
HEMATOCHEZIA: 0
SORE THROAT: 0
ARTHRALGIAS: 0
FREQUENCY: 0
PALPITATIONS: 0

## 2023-01-18 ASSESSMENT — PAIN SCALES - GENERAL: PAINLEVEL: NO PAIN (0)

## 2023-01-18 NOTE — PROGRESS NOTES
SUBJECTIVE:   CC: Nabeel is an 62 year old who presents for preventative health visit.   Patient has been advised of split billing requirements and indicates understanding: Yes  Healthy Habits:     Getting at least 3 servings of Calcium per day:  Yes    Bi-annual eye exam:  Yes    Dental care twice a year:  Yes    Sleep apnea or symptoms of sleep apnea:  None    Diet:  Regular (no restrictions)    Frequency of exercise:  6-7 days/week    Duration of exercise:  Greater than 60 minutes    Taking medications regularly:  Yes    Medication side effects:  Not applicable    PHQ-2 Total Score: 0    Additional concerns today:  No    Colonoscopy - done in 2020, tubular adenoma polpy, recommended repeat in 3 years.     Low back pain s/p discectomy in 2020. Numb left foot, limp. He feels surgery was not effective although notes he has not had a flare up in 3 years.     Has had two doses shingrix.   Flu shot done this season.     Today's PHQ-2 Score:   PHQ-2 ( 1999 Pfizer) 1/11/2023   Q1: Little interest or pleasure in doing things 0   Q2: Feeling down, depressed or hopeless 0   PHQ-2 Score 0   PHQ-2 Total Score (12-17 Years)- Positive if 3 or more points; Administer PHQ-A if positive -   Q1: Little interest or pleasure in doing things Not at all   Q2: Feeling down, depressed or hopeless Not at all   PHQ-2 Score 0       Social History     Tobacco Use     Smoking status: Never     Smokeless tobacco: Never   Substance Use Topics     Alcohol use: Yes     Comment: occa     If you drink alcohol do you typically have >3 drinks per day or >7 drinks per week? No    Alcohol Use 1/11/2023   Prescreen: >3 drinks/day or >7 drinks/week? Not Applicable       Last PSA:   PSA   Date Value Ref Range Status   02/26/2020 1.90 0 - 4 ug/L Final     Comment:     Assay Method:  Chemiluminescence using Siemens Vista analyzer       Reviewed orders with patient. Reviewed health maintenance and updated orders accordingly - Yes  BP Readings from Last 3  Encounters:   23 128/80   10/19/20 (!) 143/87   08/10/20 133/88    Wt Readings from Last 3 Encounters:   23 109 kg (240 lb 4.8 oz)   10/19/20 111 kg (244 lb 12.8 oz)   08/10/20 109.8 kg (242 lb)           Reviewed and updated as needed this visit by clinical staff   Tobacco  Allergies  Meds              Reviewed and updated as needed this visit by Provider                 Patient Active Problem List   Diagnosis     Herniation of lumbar intervertebral disc with radiculopathy     History reviewed. No pertinent past medical history.    Past Surgical History:   Procedure Laterality Date     COLONOSCOPY       DISCECTOMY LUMBAR MINIMALLY INVASIVE ONE LEVEL Left 2020    Procedure: Left Lumbar 5- Sacral 1 minimally invasive microdiscectomy;  Surgeon: Karl Estrada MD;  Location: [facility] OR     SOFT TISSUE SURGERY      cysts removed     [place] teeth       Family History   Problem Relation Age of Onset     Other Cancer Mother         pancreatic cancer  at 86 years old     Other Cancer Sister         Breast cancer - treated at age 62 and recovered     Coronary Artery Disease Father         Heart valve replacement age 74. Stent in the mid LAD age 88.     Colon Cancer No family hx of      Social History     Socioeconomic History     Marital status:      Spouse name: Not on file     Number of children: Not on file     Years of education: Not on file     Highest education level: Not on file   Occupational History     Not on file   Tobacco Use     Smoking status: Never     Smokeless tobacco: Never   Vaping Use     Vaping Use: Never used   Substance and Sexual Activity     Alcohol use: Yes     Comment: occa     Drug use: Never     Sexual activity: Yes     Partners: Female     Birth control/protection: None   Other Topics Concern     Parent/sibling w/ CABG, MI or angioplasty before 65F 55M? No   Social History Narrative    Hiking 4-5 days/week.     Worked in sales x 30 years.     Retired x 4.       to [place] since 1982.    Two kids - dtr in [facility], son in [place].        Carmen Lane, DNP, APRN, CNP    01/18/23     Social Determinants of Health     Financial Resource Strain: Low Risk      Difficulty of Paying Living Expenses: Not hard at all   Food Insecurity: No Food Insecurity     Worried About Running Out of Food in the Last Year: Never true     Ran Out of Food in the Last Year: Never true   Transportation Needs: No Transportation Needs     Lack of Transportation (Medical): No     Lack of Transportation (Non-Medical): No   Physical Activity: Sufficiently Active     Days of Exercise per Week: 7 days     Minutes of Exercise per Session: 120 min   Stress: No Stress Concern Present     Feeling of Stress : Not at all   Social Connections: Unknown     Frequency of Communication with Friends and Family: More than three times a week     Frequency of Social Gatherings with Friends and Family: Three times a week     Attends [employer] Services: Patient refused     Active Member of Clubs or Organizations: No     Attends Club or Organization Meetings: Not on file     Marital Status:    Intimate Partner Violence: Not on file   Housing Stability: Low Risk      Unable to Pay for Housing in the Last Year: No     Number of Places Lived in the Last Year: 1     Unstable Housing in the Last Year: No     Patient Active Problem List   Diagnosis     Herniation of lumbar intervertebral disc with radiculopathy     Current Outpatient Medications   Medication     fexofenadine (ALLEGRA) 180 MG tablet     No current facility-administered medications for this visit.      No Known Allergies    Review of Systems   Constitutional: Negative for chills and fever.   HENT: Negative for congestion, ear pain, hearing loss and sore throat.    Eyes: Negative for pain and visual disturbance.   Respiratory: Negative for cough and shortness of breath.    Cardiovascular: Negative for chest pain, palpitations and peripheral edema.  "  Gastrointestinal: Negative for abdominal pain, constipation, diarrhea, heartburn, hematochezia and nausea.   Genitourinary: Negative for dysuria, frequency, genital sores, hematuria, impotence, penile discharge and urgency.   Musculoskeletal: Negative for arthralgias, joint swelling and myalgias.   Skin: Negative for rash.   Neurological: Negative for dizziness, weakness, headaches and paresthesias.   Psychiatric/Behavioral: Negative for mood changes. The patient is not nervous/anxious.        OBJECTIVE:   /80 (BP Location: Right arm, Patient Position: Sitting, Cuff Size: Adult Large)   Pulse 64   Temp 97.4  F (36.3  C) (Tympanic)   Resp 16   Ht 1.854 m (6' 1\")   Wt 109 kg (240 lb 4.8 oz)   SpO2 98%   BMI 31.70 kg/m      Physical Exam  Constitutional: appears to be in no acute distress, comfortable, pleasant.   Eyes: anicteric, conjunctiva clear without drainage or erythema. ASHLEY.   Ears, Nose and Throat: tympanic membranes gray with LR,  nose without nasal discharge. OP: no erythema to posterior pharynx, negative post nasal drainage, tonsils +1 no erythema or exudate.  Neck: supple, thyroid palpable,not enlarged, no nodules   Cardiovascular: regular rate and rhythm, normal S1 and S2, no murmurs, rubs or gallops, peripheral pulses full and symmetric; negative peripheral edema   Respiratory: Air entry throughout. Breathing pattern unlabored without the use of accessory muscles. Clear to auscultation A and P, no wheezes or crackles, normal breath sounds.    Gastrointestinal: rounded, soft. Positive bowel sounds x4, nontender, no masses.   Musculoskeletal: full range of motion, no edema.   Skin: pink, turgor smooth and elastic. Negative for lesions or dryness.  Neurological: normal gait, no tremor.   Psychological: appropriate mood and affect.   Lymphatic: no cervical, axillary, supraclavicular, or infraclavicular lymphadenopathy.    Diagnostic Test Results:  Labs reviewed in Epic    ASSESSMENT/PLAN: " "  (Z00.00) Routine general medical examination at a health care facility  (primary encounter diagnosis)  Age appropriate screening and preventative care have been addressed today. Vaccinations have been reviewed and are up to date. Patient has been advised to undertake routine aerobic activity. Recommend annual vision exams as well as biannual dental exams. They will follow up for annual physical again in one year.   - Lipid panel reflex to direct LDL Fasting         (Z12.11) Colon cancer screening  (D12.6) Adenomatous polyp of colon, unspecified part of colon  Colonoscopy - done in 2020, tubular adenoma polpy, recommended repeat in 3 years.   - Colonoscopy Screening  Referral          (Z12.5) Screening for prostate cancer  Prostate cancer screening guidelines were also discussed and after an informed decision making conversation the patient has elected to perform PSA testing at this time.  - PSA, screen         (E66.9,  Z68.31) Class 1 obesity without serious comorbidity with body mass index (BMI) of 31.0 to 31.9 in adult, unspecified obesity type  Body mass index is 31.7 kg/m . Reviewed healthy lifestyle - diet and exercise.   - Lipid panel reflex to direct LDL Fasting  - Comprehensive metabolic panel (BMP + Alb, Alk Phos, ALT, AST, Total. Bili, TP)           Follow-up: Lab results pending, will follow-up as indicated after reviewing results.       COUNSELING:   Reviewed preventive health counseling, as reflected in patient instructions  Special attention given to:        Regular exercise       Vision screening       Immunizations       Colorectal cancer screening       Prostate cancer screening      BMI:   Estimated body mass index is 31.7 kg/m  as calculated from the following:    Height as of this encounter: 1.854 m (6' 1\").    Weight as of this encounter: 109 kg (240 lb 4.8 oz).   Weight management plan: Discussed healthy diet and exercise guidelines    He reports that he has never smoked. He has " never used smokeless tobacco.            ESTRELLA Mcdonald CNP  [facility]

## 2023-11-30 ENCOUNTER — PATIENT OUTREACH (OUTPATIENT)
Dept: GASTROENTEROLOGY | Facility: CLINIC | Age: 63
End: 2023-11-30

## 2023-12-19 ENCOUNTER — OFFICE VISIT (OUTPATIENT)
Dept: URGENT CARE | Facility: URGENT CARE | Age: 63
End: 2023-12-19
Payer: COMMERCIAL

## 2023-12-19 VITALS
HEART RATE: 78 BPM | DIASTOLIC BLOOD PRESSURE: 83 MMHG | OXYGEN SATURATION: 98 % | TEMPERATURE: 98 F | SYSTOLIC BLOOD PRESSURE: 143 MMHG | RESPIRATION RATE: 20 BRPM

## 2023-12-19 DIAGNOSIS — R05.1 ACUTE COUGH: ICD-10-CM

## 2023-12-19 DIAGNOSIS — H69.93 DYSFUNCTION OF BOTH EUSTACHIAN TUBES: ICD-10-CM

## 2023-12-19 DIAGNOSIS — R09.81 CONGESTION OF PARANASAL SINUS: ICD-10-CM

## 2023-12-19 DIAGNOSIS — J98.8 WHEEZING-ASSOCIATED RESPIRATORY INFECTION (WARI): Primary | ICD-10-CM

## 2023-12-19 PROCEDURE — 99214 OFFICE O/P EST MOD 30 MIN: CPT | Performed by: PHYSICIAN ASSISTANT

## 2023-12-19 RX ORDER — FLUTICASONE PROPIONATE 50 MCG
2 SPRAY, SUSPENSION (ML) NASAL DAILY
Qty: 16 G | Refills: 0 | Status: SHIPPED | OUTPATIENT
Start: 2023-12-19 | End: 2024-01-18

## 2023-12-19 RX ORDER — AZITHROMYCIN 250 MG/1
TABLET, FILM COATED ORAL
Qty: 6 TABLET | Refills: 0 | Status: SHIPPED | OUTPATIENT
Start: 2023-12-19 | End: 2023-12-24

## 2023-12-19 RX ORDER — ALBUTEROL SULFATE 90 UG/1
2 AEROSOL, METERED RESPIRATORY (INHALATION) EVERY 6 HOURS PRN
Qty: 18 G | Refills: 0 | Status: SHIPPED | OUTPATIENT
Start: 2023-12-19 | End: 2024-05-24

## 2023-12-19 RX ORDER — BENZONATATE 200 MG/1
200 CAPSULE ORAL 3 TIMES DAILY PRN
Qty: 21 CAPSULE | Refills: 0 | Status: SHIPPED | OUTPATIENT
Start: 2023-12-19 | End: 2023-12-29

## 2023-12-19 NOTE — PATIENT INSTRUCTIONS
December 19, 2023  [facility]  Care Plan:       1. Start the Z-Pack antibiotic     2. Use your Albuterol inhaler (2 puffs every 4-6 hours) as needed for cough and wheezing    3. Use the Tessalon capsules to help ease your cough     4. Use the Flonase nasal spray to help open up your congested ears (your eustachian tubes are clogged)     5. See your primary care team (or urgent care) if you are not improving in 5 days, if you are not all better in 10 days, and sooner if worsening.     -if you have any of the above, it is my medical opinion you should have a chest x-ray     6. Go to the emergency room if you develop any sudden, severe worsening (as we reviewed to day at your urgent care visit)

## 2023-12-19 NOTE — PROGRESS NOTES
ASSESSMENT/PLAN:    (J98.8) Wheezing-associated respiratory infection (WARI)  (primary encounter diagnosis)    MDM: Prolonged/progressive URI with interval worsening and associated reactive airway component. Favor secondary bacterial infection. Occult pneumonia is in differential. No respiratory distress requiring further ER or hospital inpatient management now. Treatment plan as per below. I have advised low threshold for medical follow-up if symptoms fail to improve in the next 5 days, if symptoms fail to fully resolve.  If he does require follow-up, it is my medical impression he should have a chest x-ray at that time.Urgent and emergent follow-up criteria are also reviewed. Please also see below patient discharge summary (which I reviewed with patient today verbally and provided in printed form for home review).    Plan: azithromycin (ZITHROMAX) 250 MG tablet,         albuterol (PROAIR HFA/PROVENTIL HFA/VENTOLIN         HFA) 108 (90 Base) MCG/ACT inhaler             December 19, 2023  [facility]  Care Plan:       1. Start the Z-Pack antibiotic     2. Use your Albuterol inhaler (2 puffs every 4-6 hours) as needed for cough and wheezing    3. Use the Tessalon capsules to help ease your cough     4. Use the Flonase nasal spray to help open up your congested ears (your eustachian tubes are clogged)     5. See your primary care team (or urgent care) if you are not improving in 5 days, if you are not all better in 10 days, and sooner if worsening.     -if you have any of the above, it is my medical opinion you should have a chest x-ray     6. Go to the emergency room if you develop any sudden, severe worsening (as we reviewed to day at your urgent care visit)       (R09.81) Congestion of paranasal sinus  Plan: fluticasone (FLONASE) 50 MCG/ACT nasal spray            (H69.93) Dysfunction of both eustachian tubes  Plan: fluticasone (FLONASE) 50 MCG/ACT nasal spray            (R05.1) Acute cough  Plan: benzonatate  (TESSALON) 200 MG capsule           This progress note has been dictated, with use of voice recognition software. Any grammatical, typographical, or context errors are unintentional and inherent to use of voice recognition software.  ----------------      Chief Complaint   Patient presents with    Cough     Cough/congestion X1 wk          SUBJECTIVE:    Jeremy Barnett presents to urgent care today for evaluation of acute onset runny nose and cough approximately 1+ weeks ago.  Patient reports cough has worsened (notes has become loose and productive now), notes cough is tight with wheezing, reports nasal sinus congestion has acutely worsened and he has developed associated left ear pain.      RESP HX:  No prior history of hospitalization for respiratory distress. No formal diagnosis of asthma or COPD.                ROS: Positive for subjective fever/chills and bodyaches on Saturday. No associated coughing up of blood, blue lips/fingers/toes, or severe shortness of breath (confirms they are still able to to all self cares and activities of daily living). No acute fainting, chest pain, racing or irregular heartbeats. No acute onset abdominal pain, nausea, vomiting, or diarrhea. No severe body aches, severe headaches, rashes, hives, joint swelling or other acute illness symptoms.     History reviewed. No pertinent past medical history.    Patient Active Problem List   Diagnosis    Herniation of lumbar intervertebral disc with radiculopathy       Current Outpatient Medications   Medication    fexofenadine (ALLEGRA) 180 MG tablet     No current facility-administered medications for this visit.       No Known Allergies      OBJECTIVE:  BP (!) 143/83   Pulse 78   Temp 98  F (36.7  C) (Tympanic)   Resp 20   SpO2 98%       General appearance: alert and no apparent distress  Skin color is uniform in color and without rash.  HEENT:   Conjunctiva not injected.  Sclera clear.  Left TM is intact, normal in color, with slight  bulge in clear fluid behind tympanic membrane  Right TM is normal: no effusions, no erythema, and normal landmarks.  Nasal mucosa is Congested  Oropharyngeal exam is normal other than evidence of postnasal drip: no lesions, erythema, adenopathy or exudate.  NECK: Trachea is midline. Neck is supple, FROM with no adenopathy  CARDIAC:NORMAL - regular rate and rhythm without murmur.  RESP: No increased work of breathing at rest--able to speak full sentences without pause. Positive for scattered rhonchi and faint wheezes (wheezes only with forced expiration/coughing). No rales. Still moving air well into all listening areas today.   NEURO: Alert and oriented.  Normal speech and mentation.  CN II/XII grossly intact.  Gait within normal limits.      LAB/X-RAY: The option of doing complete blood count with differential and chest x-ray to screen further for pneumonia was discussed with patient today.Patient elected to forego x-ray today in favor of treatment and will follow-up if any worsening or non-resolution after treatment provided here today.

## 2023-12-26 ENCOUNTER — OFFICE VISIT (OUTPATIENT)
Dept: URGENT CARE | Facility: URGENT CARE | Age: 63
End: 2023-12-26
Payer: COMMERCIAL

## 2023-12-26 VITALS
OXYGEN SATURATION: 98 % | WEIGHT: 225 LBS | HEART RATE: 67 BPM | SYSTOLIC BLOOD PRESSURE: 157 MMHG | BODY MASS INDEX: 29.69 KG/M2 | DIASTOLIC BLOOD PRESSURE: 102 MMHG | TEMPERATURE: 97.9 F

## 2023-12-26 DIAGNOSIS — H65.93 BILATERAL NON-SUPPURATIVE OTITIS MEDIA: Primary | ICD-10-CM

## 2023-12-26 PROCEDURE — 99213 OFFICE O/P EST LOW 20 MIN: CPT | Performed by: FAMILY MEDICINE

## 2023-12-26 RX ORDER — AMOXICILLIN 875 MG
875 TABLET ORAL 2 TIMES DAILY
Qty: 20 TABLET | Refills: 0 | Status: SHIPPED | OUTPATIENT
Start: 2023-12-26 | End: 2024-01-05

## 2023-12-26 NOTE — PROGRESS NOTES
SUBJECTIVE:   Jeremy Barnett is a 63 year old male presenting with a chief complaint of ear pain/plugged.    Predisposing factors include recent illness -WARI.    Seen in  on 12/19 - RX Zpak and RX albuterol inhaler given.  Symptoms much improved.    Developed left ear pain/plugged for 1 week, now right is plugged and is unable to hear.  No fever.  No h/o recurrent ear infections    No past medical history on file.  Current Outpatient Medications   Medication Sig Dispense Refill    albuterol (PROAIR HFA/PROVENTIL HFA/VENTOLIN HFA) 108 (90 Base) MCG/ACT inhaler Inhale 2 puffs into the lungs every 6 hours as needed for wheezing or cough 18 g 0    fexofenadine (ALLEGRA) 180 MG tablet Take 180 mg by mouth daily as needed for allergies      fluticasone (FLONASE) 50 MCG/ACT nasal spray Spray 2 sprays into both nostrils daily for 30 days 16 g 0    benzonatate (TESSALON) 200 MG capsule Take 1 capsule (200 mg) by mouth 3 times daily as needed for cough (Patient not taking: Reported on 12/26/2023) 21 capsule 0     Social History     Tobacco Use    Smoking status: Never    Smokeless tobacco: Never   Substance Use Topics    Alcohol use: Yes     Comment: occa       ROS:  Review of systems negative except as stated above.    OBJECTIVE:  BP (!) 157/102 (BP Location: Right arm, Patient Position: Sitting, Cuff Size: Adult Regular)   Pulse 67   Temp 97.9  F (36.6  C) (Oral)   Wt 102.1 kg (225 lb)   SpO2 98%   BMI 29.69 kg/m    GENERAL APPEARANCE: healthy, alert and no distress  EYES: EOMI,  PERRL, conjunctiva clear  HENT: bilateral ear canals normal.  Right TM - pink, dull, opaque.  Left TM - mild pink, opaque  RESP: lungs with no audible wheezes or increase work of breathing  PSYCH: mentation appears normal and affect normal/bright    ASSESSMENT/PLAN:  (H65.93) Bilateral non-suppurative otitis media  (primary encounter diagnosis)  Comment: post URI symptoms  Plan: amoxicillin (AMOXIL) 875 MG tablet            Reviewed that  ear symptoms are due to middle ear infection, this can occur post URI symptoms.  RX Amoxicillin given for treatment of bilateral middle ear infection, encourage tylenol, ibuprofen for discomfort as needed.    Follow up with primary provider if no improvement of symptoms in 1-2 weeks    Nik Momin MD  December 26, 2023 12:32 PM

## 2024-01-05 ENCOUNTER — TELEPHONE (OUTPATIENT)
Dept: GASTROENTEROLOGY | Facility: CLINIC | Age: 64
End: 2024-01-05
Payer: COMMERCIAL

## 2024-01-05 NOTE — TELEPHONE ENCOUNTER
"Endoscopy Scheduling Screen    Have you had a positive Covid test in the last 14 days?  No    Are you active on MyChart?   Yes    What insurance is in the chart?  Other:  [facility]/ MEDICARE    Ordering/Referring Provider: BARON LORD   (If ordering provider performs procedure, schedule with ordering provider unless otherwise instructed. )    BMI: Estimated body mass index is 29.69 kg/m  as calculated from the following:    Height as of 1/18/23: 1.854 m (6' 1\").    Weight as of 12/26/23: 102.1 kg (225 lb).     Sedation Ordered  moderate sedation.   If patient BMI > 50 do not schedule in [facility].    If patient BMI > 45 do not schedule at [facility].    Are you taking methadone or Suboxone?  No    Are you taking any prescription medications for pain 3 or more times per week?   NO - No RN review required.    Do you have a history of malignant hyperthermia or adverse reaction to anesthesia?  No    (Females) Are you currently pregnant?   No     Have you been diagnosed or told you have pulmonary hypertension?   No    Do you have an LVAD?  No    Have you been told you have moderate to severe sleep apnea?  No    Have you been told you have COPD, asthma, or any other lung disease?  No    Do you have any heart conditions?  No     Have you ever had an organ transplant?   No    Have you ever had or are you awaiting a heart or lung transplant?   No    Have you had a stroke or transient ischemic attack (TIA aka \"mini stroke\" in the last 6 months?   No    Have you been diagnosed with or been told you have cirrhosis of the liver?   No    Are you currently on dialysis?   No    Do you need assistance transferring?   No    BMI: Estimated body mass index is 29.69 kg/m  as calculated from the following:    Height as of 1/18/23: 1.854 m (6' 1\").    Weight as of 12/26/23: 102.1 kg (225 lb).     Is patients BMI > 40 and scheduling location [facility]?  No    Do you take an injectable medication for weight loss or diabetes (excluding insulin)?  No    Do you " take the medication Naltrexone?  No    Do you take blood thinners?  No       Prep   Are you currently on dialysis or do you have chronic kidney disease?  No    Do you have a diagnosis of diabetes?  No    Do you have a diagnosis of cystic fibrosis (CF)?  No    On a regular basis do you go 3 -5 days between bowel movements?  No    BMI > 40?  No    Preferred Pharmacy:    [id number] - [place], [place] - [address]  [address]  [place] [address]  Phone: [phone] Fax: [phone]      Final Scheduling Details   Colonoscopy prep sent?  Standard MiraLAX    Procedure scheduled  Colonoscopy    Surgeon:  JED     Date of procedure:  4/19/24     Pre-OP / PAC:   No - Not required for this site.    Location  [facility] - Per order.    Sedation   Moderate Sedation - Per order.      Patient Reminders:   You will receive a call from a Nurse to review instructions and health history.  This assessment must be completed prior to your procedure.  Failure to complete the Nurse assessment may result in the procedure being cancelled.      On the day of your procedure, please designate an adult(s) who can drive you home stay with you for the next 24 hours. The medicines used in the exam will make you sleepy. You will not be able to drive.      You cannot take public transportation, ride share services, or non-medical taxi service without a responsible caregiver.  Medical transport services are allowed with the requirement that a responsible caregiver will receive you at your destination.  We require that drivers and caregivers are confirmed prior to your procedure.

## 2024-02-19 ENCOUNTER — TELEPHONE (OUTPATIENT)
Dept: GASTROENTEROLOGY | Facility: CLINIC | Age: 64
End: 2024-02-19
Payer: COMMERCIAL

## 2024-02-19 NOTE — TELEPHONE ENCOUNTER
Caller: Writer to Pt     Reason for Reschedule/Cancellation   (please be detailed, any staff messages or encounters to note?): Jed NOWAK      Prior to reschedule please review:  Ordering Provider: Carmen Lane APRN CNP   Sedation Determined: Moderate  Does patient have any ASC Exclusions, please identify?: No      Notes on Cancelled Procedure:  Procedure: Lower Endoscopy [Colonoscopy]   Date: 04/19/2024  Location: [facility]; [facility] [address] [place] [address]  Surgeon: JED      Rescheduled: Yes,   Procedure: Lower Endoscopy [Colonoscopy]    Date: 05/10/2024   Location: [facility]; [facility] [address] [place] [address]   Surgeon: PREMA   Sedation Level Scheduled  Moderate,  Reason for Sedation Level Per Order   Prep/Instructions updated and sent: Yes, adwoa       Did you cancel or rescheduled an EUS procedure? No.

## 2024-02-26 ENCOUNTER — TRANSFERRED RECORDS (OUTPATIENT)
Dept: HEALTH INFORMATION MANAGEMENT | Facility: CLINIC | Age: 64
End: 2024-02-26
Payer: COMMERCIAL

## 2024-03-16 ENCOUNTER — HEALTH MAINTENANCE LETTER (OUTPATIENT)
Age: 64
End: 2024-03-16

## 2024-04-11 ENCOUNTER — TRANSFERRED RECORDS (OUTPATIENT)
Dept: HEALTH INFORMATION MANAGEMENT | Facility: CLINIC | Age: 64
End: 2024-04-11
Payer: COMMERCIAL

## 2024-04-30 ENCOUNTER — TELEPHONE (OUTPATIENT)
Dept: GASTROENTEROLOGY | Facility: CLINIC | Age: 64
End: 2024-04-30

## 2024-04-30 NOTE — TELEPHONE ENCOUNTER
Pre visit planning completed.      Procedure details:    Patient scheduled for Colonoscopy  on 05.10.2024.     Arrival time: 1030. Procedure time 1115    Facility location: [facility]; [place] [address]. Check in location: Main entrance at . Come through the roundabout underneath the awning (not the ER entrance).    Sedation type: Conscious sedation     Pre op exam needed? N/A    Indication for procedure:   Adenomatous polyp of colon, unspecified part of colon   Colon cancer screening         Chart review:     Electronic implanted devices? No    Recent diagnosis of diverticulitis within the last 6 weeks? No    Diabetic? No      Medication review:    Anticoagulants? No    NSAIDS? No NSAID medications per patient's medication list.  RN will verify with pre-assessment call.    Other medication HOLDING recommendations:  N/A      Prep for procedure:     Bowel prep recommendation: Standard Miralax  Due to: standard bowel prep.    Prep instructions sent via Qloud         Shreya Fink RN  Endoscopy Procedure Pre Assessment RN  [phone] option 4

## 2024-05-01 NOTE — TELEPHONE ENCOUNTER
Pre assessment completed for upcoming procedure.   (Please see previous telephone encounter notes for complete details)        Procedure details:    Arrival time and facility location reviewed.    Pre op exam needed? N/A    Designated  policy reviewed. Instructed to have someone stay 6  hours post procedure.       Medication review:    Medications reviewed. Please see supporting documentation below. Holding recommendations discussed (if applicable).   N/A      Prep for procedure:     Procedure prep instructions reviewed.        Any additional information needed:  N/A      Patient  verbalized understanding and had no questions or concerns at this time.      Shreya Fink RN  Endoscopy Procedure Pre Assessment RN  [phone] option 4

## 2024-05-10 ENCOUNTER — HOSPITAL ENCOUNTER (OUTPATIENT)
Facility: CLINIC | Age: 64
Discharge: HOME | End: 2024-05-10
Attending: COLON & RECTAL SURGERY | Admitting: COLON & RECTAL SURGERY
Payer: COMMERCIAL

## 2024-05-10 VITALS
DIASTOLIC BLOOD PRESSURE: 83 MMHG | RESPIRATION RATE: 16 BRPM | WEIGHT: 230 LBS | OXYGEN SATURATION: 99 % | HEIGHT: 73 IN | BODY MASS INDEX: 30.48 KG/M2 | SYSTOLIC BLOOD PRESSURE: 122 MMHG | HEART RATE: 58 BPM

## 2024-05-10 LAB — COLONOSCOPY: NORMAL

## 2024-05-10 PROCEDURE — 250N000011 HC RX IP 250 OP 636: Performed by: COLON & RECTAL SURGERY

## 2024-05-10 PROCEDURE — G0500 MOD SEDAT ENDO SERVICE >5YRS: HCPCS | Performed by: COLON & RECTAL SURGERY

## 2024-05-10 PROCEDURE — G0121 COLON CA SCRN NOT HI RSK IND: HCPCS | Performed by: COLON & RECTAL SURGERY

## 2024-05-10 PROCEDURE — 45378 DIAGNOSTIC COLONOSCOPY: CPT | Performed by: COLON & RECTAL SURGERY

## 2024-05-10 PROCEDURE — G0105 COLORECTAL SCRN; HI RISK IND: HCPCS | Performed by: COLON & RECTAL SURGERY

## 2024-05-10 RX ORDER — NALOXONE HYDROCHLORIDE 0.4 MG/ML
0.4 INJECTION, SOLUTION INTRAMUSCULAR; INTRAVENOUS; SUBCUTANEOUS
Status: DISCONTINUED | OUTPATIENT
Start: 2024-05-10 | End: 2024-05-10 | Stop reason: HOSPADM

## 2024-05-10 RX ORDER — FENTANYL CITRATE 50 UG/ML
50-100 INJECTION, SOLUTION INTRAMUSCULAR; INTRAVENOUS EVERY 5 MIN PRN
Status: DISCONTINUED | OUTPATIENT
Start: 2024-05-10 | End: 2024-05-10 | Stop reason: HOSPADM

## 2024-05-10 RX ORDER — ONDANSETRON 4 MG/1
4 TABLET, ORALLY DISINTEGRATING ORAL EVERY 6 HOURS PRN
Status: DISCONTINUED | OUTPATIENT
Start: 2024-05-10 | End: 2024-05-10 | Stop reason: HOSPADM

## 2024-05-10 RX ORDER — ONDANSETRON 2 MG/ML
4 INJECTION INTRAMUSCULAR; INTRAVENOUS
Status: DISCONTINUED | OUTPATIENT
Start: 2024-05-10 | End: 2024-05-10 | Stop reason: HOSPADM

## 2024-05-10 RX ORDER — EPINEPHRINE 1 MG/ML
0.1 INJECTION, SOLUTION INTRAMUSCULAR; SUBCUTANEOUS
Status: DISCONTINUED | OUTPATIENT
Start: 2024-05-10 | End: 2024-05-10 | Stop reason: HOSPADM

## 2024-05-10 RX ORDER — SIMETHICONE 40MG/0.6ML
133 SUSPENSION, DROPS(FINAL DOSAGE FORM)(ML) ORAL
Status: DISCONTINUED | OUTPATIENT
Start: 2024-05-10 | End: 2024-05-10 | Stop reason: HOSPADM

## 2024-05-10 RX ORDER — FLUMAZENIL 0.1 MG/ML
0.2 INJECTION, SOLUTION INTRAVENOUS
Status: DISCONTINUED | OUTPATIENT
Start: 2024-05-10 | End: 2024-05-10 | Stop reason: HOSPADM

## 2024-05-10 RX ORDER — LIDOCAINE 40 MG/G
CREAM TOPICAL
Status: DISCONTINUED | OUTPATIENT
Start: 2024-05-10 | End: 2024-05-10 | Stop reason: HOSPADM

## 2024-05-10 RX ORDER — ATROPINE SULFATE 0.1 MG/ML
1 INJECTION INTRAVENOUS
Status: DISCONTINUED | OUTPATIENT
Start: 2024-05-10 | End: 2024-05-10 | Stop reason: HOSPADM

## 2024-05-10 RX ORDER — NALOXONE HYDROCHLORIDE 0.4 MG/ML
0.2 INJECTION, SOLUTION INTRAMUSCULAR; INTRAVENOUS; SUBCUTANEOUS
Status: DISCONTINUED | OUTPATIENT
Start: 2024-05-10 | End: 2024-05-10 | Stop reason: HOSPADM

## 2024-05-10 RX ORDER — ONDANSETRON 2 MG/ML
4 INJECTION INTRAMUSCULAR; INTRAVENOUS EVERY 6 HOURS PRN
Status: DISCONTINUED | OUTPATIENT
Start: 2024-05-10 | End: 2024-05-10 | Stop reason: HOSPADM

## 2024-05-10 RX ORDER — PROCHLORPERAZINE MALEATE 5 MG
5 TABLET ORAL EVERY 6 HOURS PRN
Status: DISCONTINUED | OUTPATIENT
Start: 2024-05-10 | End: 2024-05-10 | Stop reason: HOSPADM

## 2024-05-10 RX ORDER — DIPHENHYDRAMINE HYDROCHLORIDE 50 MG/ML
25-50 INJECTION INTRAMUSCULAR; INTRAVENOUS
Status: DISCONTINUED | OUTPATIENT
Start: 2024-05-10 | End: 2024-05-10 | Stop reason: HOSPADM

## 2024-05-10 RX ADMIN — FENTANYL CITRATE 25 MCG: 50 INJECTION, SOLUTION INTRAMUSCULAR; INTRAVENOUS at 11:14

## 2024-05-10 RX ADMIN — MIDAZOLAM 1 MG: 1 INJECTION INTRAMUSCULAR; INTRAVENOUS at 11:14

## 2024-05-10 RX ADMIN — FENTANYL CITRATE 100 MCG: 50 INJECTION, SOLUTION INTRAMUSCULAR; INTRAVENOUS at 11:09

## 2024-05-10 RX ADMIN — MIDAZOLAM 2 MG: 1 INJECTION INTRAMUSCULAR; INTRAVENOUS at 11:09

## 2024-05-10 ASSESSMENT — ACTIVITIES OF DAILY LIVING (ADL)
ADLS_ACUITY_SCORE: 35

## 2024-05-10 NOTE — H&P
Pre-Endoscopy History and Physical     Jeremy Barentt MRN# [id number]   YOB: 1960 Age: 63 year old     Date of Procedure: 5/10/2024  Primary care provider: Carmen Lane  Type of Endoscopy: colonoscopy  Reason for Procedure: surveillance, hx polyps  Type of Anesthesia Anticipated: Moderate Sedation    HPI:    Jeremy is a 63 year old male who will be undergoing the above procedure.      A history and physical has been performed. The patient's medications and allergies have been reviewed. The risks and benefits of the procedure and the sedation options and risks were discussed with the patient.  All questions were answered and informed consent was obtained.      He denies a personal or family history of anesthesia complications or bleeding disorders.     No Known Allergies     Prior to Admission Medications   Prescriptions Last Dose Informant Patient Reported? Taking?   albuterol (PROAIR HFA/PROVENTIL HFA/VENTOLIN HFA) 108 (90 Base) MCG/ACT inhaler   No No   Sig: Inhale 2 puffs into the lungs every 6 hours as needed for wheezing or cough   fexofenadine (ALLEGRA) 180 MG tablet 5/10/2024  Yes Yes   Sig: Take 180 mg by mouth daily as needed for allergies      Facility-Administered Medications: None       Patient Active Problem List   Diagnosis    Herniation of lumbar intervertebral disc with radiculopathy        History reviewed. No pertinent past medical history.     Past Surgical History:   Procedure Laterality Date    COLONOSCOPY      DISCECTOMY LUMBAR MINIMALLY INVASIVE ONE LEVEL Left 5/13/2020    Procedure: Left Lumbar 5- Sacral 1 minimally invasive microdiscectomy;  Surgeon: Karl Estrada MD;  Location: [facility] OR    SOFT TISSUE SURGERY  1986    cysts removed    [place] teeth         Social History     Tobacco Use    Smoking status: Never    Smokeless tobacco: Never   Substance Use Topics    Alcohol use: Yes     Comment: occa       Family History   Problem Relation Age of Onset    Other Cancer  "Mother         pancreatic cancer  at 86 years old    Other Cancer Sister         Breast cancer - treated at age 62 and recovered    Coronary Artery Disease Father         Heart valve replacement age 74. Stent in the mid LAD age 88.    Colon Cancer No family hx of        REVIEW OF SYSTEMS:     5 point ROS negative except as noted above in HPI, including Gen., Resp., CV, GI &  system review.      PHYSICAL EXAM:   Ht 1.854 m (6' 1\")   Wt 104.3 kg (230 lb)   BMI 30.34 kg/m   Estimated body mass index is 30.34 kg/m  as calculated from the following:    Height as of this encounter: 1.854 m (6' 1\").    Weight as of this encounter: 104.3 kg (230 lb).   GENERAL APPEARANCE: healthy and alert  MENTAL STATUS: alert  AIRWAY EXAM: Mallampatti Class II (visualization of the soft palate, fauces, and uvula)  RESP: lungs clear to auscultation - no rales, rhonchi or wheezes  CV: regular rates and rhythm      DIAGNOSTICS:    Not indicated      IMPRESSION   ASA Class 2 - Mild systemic disease        PLAN:       Plan for colonoscopy. We discussed the risks, benefits and alternatives and the patient wished to proceed.    The above has been forwarded to the consulting provider.      Signed Electronically by: Lzi Aguilar MD  May 10, 2024    "

## 2024-05-20 SDOH — HEALTH STABILITY: PHYSICAL HEALTH: ON AVERAGE, HOW MANY MINUTES DO YOU ENGAGE IN EXERCISE AT THIS LEVEL?: 90 MIN

## 2024-05-20 SDOH — HEALTH STABILITY: PHYSICAL HEALTH: ON AVERAGE, HOW MANY DAYS PER WEEK DO YOU ENGAGE IN MODERATE TO STRENUOUS EXERCISE (LIKE A BRISK WALK)?: 7 DAYS

## 2024-05-20 ASSESSMENT — SOCIAL DETERMINANTS OF HEALTH (SDOH): HOW OFTEN DO YOU GET TOGETHER WITH FRIENDS OR RELATIVES?: MORE THAN THREE TIMES A WEEK

## 2024-05-24 ENCOUNTER — OFFICE VISIT (OUTPATIENT)
Dept: PEDIATRICS | Facility: CLINIC | Age: 64
End: 2024-05-24
Payer: COMMERCIAL

## 2024-05-24 VITALS
SYSTOLIC BLOOD PRESSURE: 128 MMHG | RESPIRATION RATE: 16 BRPM | OXYGEN SATURATION: 98 % | HEART RATE: 66 BPM | WEIGHT: 232.1 LBS | TEMPERATURE: 96.9 F | HEIGHT: 72 IN | BODY MASS INDEX: 31.44 KG/M2 | DIASTOLIC BLOOD PRESSURE: 78 MMHG

## 2024-05-24 DIAGNOSIS — E66.09 CLASS 1 OBESITY DUE TO EXCESS CALORIES WITH SERIOUS COMORBIDITY AND BODY MASS INDEX (BMI) OF 31.0 TO 31.9 IN ADULT: ICD-10-CM

## 2024-05-24 DIAGNOSIS — Z00.00 MEDICARE ANNUAL WELLNESS VISIT, INITIAL: Primary | ICD-10-CM

## 2024-05-24 DIAGNOSIS — E78.5 HYPERLIPIDEMIA LDL GOAL <70: ICD-10-CM

## 2024-05-24 DIAGNOSIS — G89.29 CHRONIC RIGHT SHOULDER PAIN: ICD-10-CM

## 2024-05-24 DIAGNOSIS — E66.811 CLASS 1 OBESITY DUE TO EXCESS CALORIES WITH SERIOUS COMORBIDITY AND BODY MASS INDEX (BMI) OF 31.0 TO 31.9 IN ADULT: ICD-10-CM

## 2024-05-24 DIAGNOSIS — H81.10 BENIGN PAROXYSMAL POSITIONAL VERTIGO, UNSPECIFIED LATERALITY: ICD-10-CM

## 2024-05-24 DIAGNOSIS — Z12.5 SCREENING FOR PROSTATE CANCER: ICD-10-CM

## 2024-05-24 DIAGNOSIS — M25.511 CHRONIC RIGHT SHOULDER PAIN: ICD-10-CM

## 2024-05-24 PROCEDURE — 80053 COMPREHEN METABOLIC PANEL: CPT | Performed by: NURSE PRACTITIONER

## 2024-05-24 PROCEDURE — 80061 LIPID PANEL: CPT | Performed by: NURSE PRACTITIONER

## 2024-05-24 PROCEDURE — 99214 OFFICE O/P EST MOD 30 MIN: CPT | Mod: 25 | Performed by: NURSE PRACTITIONER

## 2024-05-24 PROCEDURE — G0402 INITIAL PREVENTIVE EXAM: HCPCS | Performed by: NURSE PRACTITIONER

## 2024-05-24 PROCEDURE — G0103 PSA SCREENING: HCPCS | Performed by: NURSE PRACTITIONER

## 2024-05-24 PROCEDURE — 36415 COLL VENOUS BLD VENIPUNCTURE: CPT | Performed by: NURSE PRACTITIONER

## 2024-05-24 ASSESSMENT — PAIN SCALES - GENERAL: PAINLEVEL: NO PAIN (0)

## 2024-05-24 NOTE — PROGRESS NOTES
Preventive Care Visit  [facility] ESTRELLA Aleman CNP, Family Medicine  May 24, 2024      Assessment & Plan     Medicare annual wellness visit, initial  Age appropriate screening and preventative care have been addressed today. Vaccinations have been reviewed - recommend PCV20 and COVID-19 vaccines be obtained through his retail pharmacy. Patient has been advised to follow a balanced diet with reduction in cholesterol, and reasonable portion sizes. They have been advised to undertake routine aerobic activity and they were counseled on healthy weight maintenance. Colonoscopy has been reviewed and is up to date at this time. Prostate cancer screening guidelines were also discussed and after an informed decision making conversation the patient has elected to perform PSA testing at this time. Recommend annual vision exams as well as biannual dental exams. They will follow up for annual physical again in one year.     Screening for prostate cancer  - PSA, screen    Class 1 obesity due to excess calories with serious comorbidity and body mass index (BMI) of 31.0 to 31.9 in adult  Body mass index is 31.61 kg/m . Continue regular physical activity and reviewed healthy dietary recommendations including cutting back on processed carbs and added sugars.   - Lipid panel reflex to direct LDL Fasting  - Comprehensive metabolic panel (BMP + Alb, Alk Phos, ALT, AST, Total. Bili, TP)    Hyperlipidemia LDL goal <70  Reviewed ASCVD risk score today and indications for starting statin. Will repeat FLP today.   - Lipid panel reflex to direct LDL Fasting  - Comprehensive metabolic panel (BMP + Alb, Alk Phos, ALT, AST, Total. Bili, TP)    Chronic right shoulder pain  Worse over the past year. No known injury. He will continue to monitor for now. Discussed PT and possible x-ray as next step.     Benign paroxysmal positional vertigo, unspecified laterality  1-2 episodes/year. Offered PT referral and/or meclizine rx  "but he declines for now.             BMI  Estimated body mass index is 31.61 kg/m  as calculated from the following:    Height as of this encounter: 1.825 m (5' 11.85\").    Weight as of this encounter: 105.3 kg (232 lb 1.6 oz).   Weight management plan: Discussed healthy diet and exercise guidelines    Counseling  Appropriate preventive services were discussed with this patient, including applicable screening as appropriate for fall prevention, nutrition, physical activity, Tobacco-use cessation, weight loss and cognition.  Checklist reviewing preventive services available has been given to the patient.  Reviewed patient's diet, addressing concerns and/or questions.   He is at risk for psychosocial distress and has been provided with information to reduce risk.         [place]   Nabeel is a 63 year old, presenting for the following:  Medicare Visit        5/24/2024     7:44 AM   Additional Questions   Roomed by Yudelka   Accompanied by self         5/24/2024     7:44 AM   Patient Reported Additional Medications   Patient reports taking the following new medications no        Health Care Directive  Patient has a Health Care Directive on file  Advance care planning document is on file and is current.    HPI    Vertigo a couple times/year. Closely related to position changes with his head. Possibly better over time.     Right shoulder pain increasing over the past 12 months. Affects range of motion. No known injury.         5/20/2024   General Health   How would you rate your overall physical health? Excellent   Feel stress (tense, anxious, or unable to sleep) Only a little   (!) STRESS CONCERN      5/20/2024   Nutrition   Diet: Regular (no restrictions)         5/20/2024   Exercise   Days per week of moderate/strenous exercise 7 days   Average minutes spent exercising at this level 90 min         5/20/2024   Social Factors   Frequency of gathering with friends or relatives More than three times a week   Worry food won't " last until get money to buy more No   Food not last or not have enough money for food? No   Do you have housing?  Yes   Are you worried about losing your housing? No   Lack of transportation? No   Unable to get utilities (heat,electricity)? No         5/20/2024   Fall Risk   Fallen 2 or more times in the past year? No    No   Trouble with walking or balance? No    No          5/20/2024   Activities of Daily Living- Home Safety   Needs help with the following daily activites None of the above   Safety concerns in the home None of the above         5/20/2024   Dental   Dentist two times every year? Yes         5/20/2024   Hearing Screening   Hearing concerns? None of the above         5/20/2024   Driving Risk Screening   Patient/family members have concerns about driving No         5/20/2024   General Alertness/Fatigue Screening   Have you been more tired than usual lately? No         5/20/2024   Urinary Incontinence Screening   Bothered by leaking urine in past 6 months No         5/20/2024   TB Screening   Were you born outside of the [place]? No         Today's PHQ-2 Score:       5/24/2024     7:29 AM   PHQ-2 ( 1999 Pfizer)   Q1: Little interest or pleasure in doing things 0   Q2: Feeling down, depressed or hopeless 0   PHQ-2 Score 0   Q1: Little interest or pleasure in doing things Not at all   Q2: Feeling down, depressed or hopeless Not at all   PHQ-2 Score 0           5/20/2024   Substance Use   Alcohol more than 3/day or more than 7/wk No   Do you have a current opioid prescription? No   How severe/bad is pain from 1 to 10? 0/10 (No Pain)   Do you use any other substances recreationally? No     Social History     Tobacco Use    Smoking status: Never    Smokeless tobacco: Never   Vaping Use    Vaping status: Never Used   Substance Use Topics    Alcohol use: Yes     Comment: occa    Drug use: Never           5/20/2024   AAA Screening   Family history of Abdominal Aortic Aneurysm (AAA)? No   Last PSA:   PSA   Date  Value Ref Range Status   2020 1.90 0 - 4 ug/L Final     Comment:     Assay Method:  Chemiluminescence using Siemens Vista analyzer     Prostate Specific Antigen Screen   Date Value Ref Range Status   2023 1.32 0.00 - 4.50 ng/mL Final     ASCVD Risk   The 10-year ASCVD risk score (Obi PALMA, et al., 2019) is: 9.6%    Values used to calculate the score:      Age: 63 years      Sex: Male      Is Non- : No      Diabetic: No      Tobacco smoker: No      Systolic Blood Pressure: 128 mmHg      Is BP treated: No      HDL Cholesterol: 66 mg/dL      Total Cholesterol: 167 mg/dL          Reviewed and updated as needed this visit by Provider                    Patient Active Problem List   Diagnosis    Herniation of lumbar intervertebral disc with radiculopathy     Past Surgical History:   Procedure Laterality Date    COLONOSCOPY      COLONOSCOPY N/A 5/10/2024    Procedure: Colonoscopy;  Surgeon: Liz Aguilar MD;  Location: [facility]    DISCECTOMY LUMBAR MINIMALLY INVASIVE ONE LEVEL Left 2020    Procedure: Left Lumbar 5- Sacral 1 minimally invasive microdiscectomy;  Surgeon: Karl Estrada MD;  Location: [facility] OR    SOFT TISSUE SURGERY      cysts removed    [facility] teeth         Social History     Tobacco Use    Smoking status: Never    Smokeless tobacco: Never   Substance Use Topics    Alcohol use: Yes     Comment: occa     Family History   Problem Relation Age of Onset    Other Cancer Mother         pancreatic cancer  at 86 years old    Other Cancer Sister         Breast cancer - treated at age 62 and recovered    Coronary Artery Disease Father         Heart valve replacement age 74. Stent in the mid LAD age 88.    Colon Cancer No family hx of          Current providers sharing in care for this patient include:  Patient Care Team:  Carmen Lane APRN CNP as PCP - General (Family Medicine)  Carmen Lane APRN CNP as Assigned PCP    [facility]  "maintenance items are reviewed in Epic and correct as of today:  Health Maintenance   Topic Date Due    MEDICARE ANNUAL WELLNESS VISIT  01/18/2024    ANNUAL REVIEW OF HM ORDERS  01/18/2024    COVID-19 Vaccine (7 - 2023-24 season) 02/09/2024    Pneumococcal Vaccine: 65+ Years (1 of 1 - PCV) 12/26/2023    ADVANCE CARE PLANNING  05/19/2025    FALL RISK ASSESSMENT  05/24/2025    GLUCOSE  01/18/2026    DTAP/TDAP/TD IMMUNIZATION (2 - Td or Tdap) 01/09/2028    LIPID  01/18/2028    COLORECTAL CANCER SCREENING  05/10/2029    HEPATITIS C SCREENING  Completed    HIV SCREENING  Completed    PHQ-2 (once per calendar year)  Completed    INFLUENZA VACCINE  Completed    ZOSTER IMMUNIZATION  Completed    RSV VACCINE (Pregnancy & 60+)  Completed    IPV IMMUNIZATION  Aged Out    HPV IMMUNIZATION  Aged Out    MENINGITIS IMMUNIZATION  Aged Out    RSV MONOCLONAL ANTIBODY  Aged Out          Objective    Exam  /78 (BP Location: Right arm, Patient Position: Sitting, Cuff Size: Adult Large)   Pulse 66   Temp 96.9  F (36.1  C) (Tympanic)   Resp 16   Ht 1.825 m (5' 11.85\")   Wt 105.3 kg (232 lb 1.6 oz)   SpO2 98%   BMI 31.61 kg/m     Estimated body mass index is 31.61 kg/m  as calculated from the following:    Height as of this encounter: 1.825 m (5' 11.85\").    Weight as of this encounter: 105.3 kg (232 lb 1.6 oz).    Physical Exam  Constitutional: appears to be in no acute distress, comfortable, pleasant.   Eyes: anicteric, conjunctiva clear without drainage or erythema. ASHLEY.   Ears, Nose and Throat: tympanic membranes gray with LR,  nose without nasal discharge. OP: no erythema to posterior pharynx, negative post nasal drainage, tonsils +1 no erythema or exudate.  Neck: supple, thyroid palpable,not enlarged, no nodules   Cardiovascular: regular rate and rhythm, normal S1 and S2, no murmurs, rubs or gallops, peripheral pulses full and symmetric; negative peripheral edema   Respiratory: Air entry throughout. Breathing pattern " unlabored without the use of accessory muscles. Clear to auscultation A and P, no wheezes or crackles, normal breath sounds.    Gastrointestinal: rounded, soft. Positive bowel sounds x4, nontender, no masses.   Musculoskeletal: full range of motion, no edema.   Skin: pink, turgor smooth and elastic. Negative for lesions or dryness.  Neurological: normal gait, no tremor.   Psychological: appropriate mood and affect.   Lymphatic: no cervical, axillary, supraclavicular, or infraclavicular lymphadenopathy.         5/24/2024   Mini Cog   Clock Draw Score 2 Normal   3 Item Recall 3 objects recalled   Mini Cog Total Score 5         Vision Screen         Signed Electronically by: ESTRELLA Mcdonald CNP

## 2024-05-25 LAB
ALBUMIN SERPL BCG-MCNC: 4.1 G/DL (ref 3.5–5.2)
ALP SERPL-CCNC: 120 U/L (ref 40–150)
ALT SERPL W P-5'-P-CCNC: 16 U/L (ref 0–70)
ANION GAP SERPL CALCULATED.3IONS-SCNC: 9 MMOL/L (ref 7–15)
AST SERPL W P-5'-P-CCNC: 17 U/L (ref 0–45)
BILIRUB SERPL-MCNC: 0.4 MG/DL
BUN SERPL-MCNC: 17.4 MG/DL (ref 8–23)
CALCIUM SERPL-MCNC: 8.6 MG/DL (ref 8.8–10.2)
CHLORIDE SERPL-SCNC: 106 MMOL/L (ref 98–107)
CHOLEST SERPL-MCNC: 157 MG/DL
CREAT SERPL-MCNC: 1.13 MG/DL (ref 0.67–1.17)
DEPRECATED HCO3 PLAS-SCNC: 24 MMOL/L (ref 22–29)
EGFRCR SERPLBLD CKD-EPI 2021: 72 ML/MIN/1.73M2
FASTING STATUS PATIENT QL REPORTED: YES
FASTING STATUS PATIENT QL REPORTED: YES
GLUCOSE SERPL-MCNC: 97 MG/DL (ref 70–99)
HDLC SERPL-MCNC: 58 MG/DL
LDLC SERPL CALC-MCNC: 92 MG/DL
NONHDLC SERPL-MCNC: 99 MG/DL
POTASSIUM SERPL-SCNC: 4.5 MMOL/L (ref 3.4–5.3)
PROT SERPL-MCNC: 6.5 G/DL (ref 6.4–8.3)
PSA SERPL DL<=0.01 NG/ML-MCNC: 3.05 NG/ML (ref 0–4.5)
SODIUM SERPL-SCNC: 139 MMOL/L (ref 135–145)
TRIGL SERPL-MCNC: 35 MG/DL

## 2024-06-26 ENCOUNTER — LAB (OUTPATIENT)
Dept: LAB | Facility: CLINIC | Age: 64
End: 2024-06-26
Payer: COMMERCIAL

## 2024-06-26 DIAGNOSIS — Z12.5 SCREENING FOR PROSTATE CANCER: ICD-10-CM

## 2024-06-26 LAB — PSA SERPL DL<=0.01 NG/ML-MCNC: 1.98 NG/ML (ref 0–4.5)

## 2024-06-26 PROCEDURE — G0103 PSA SCREENING: HCPCS

## 2024-06-26 PROCEDURE — 36415 COLL VENOUS BLD VENIPUNCTURE: CPT

## 2024-12-04 ENCOUNTER — LAB (OUTPATIENT)
Dept: LAB | Facility: CLINIC | Age: 64
End: 2024-12-04
Payer: COMMERCIAL

## 2024-12-04 DIAGNOSIS — E78.5 HYPERLIPIDEMIA LDL GOAL <70: ICD-10-CM

## 2024-12-04 LAB
CHOLEST SERPL-MCNC: 181 MG/DL
FASTING STATUS PATIENT QL REPORTED: YES
HDLC SERPL-MCNC: 74 MG/DL
LDLC SERPL CALC-MCNC: 99 MG/DL
NONHDLC SERPL-MCNC: 107 MG/DL
TRIGL SERPL-MCNC: 40 MG/DL

## 2024-12-04 PROCEDURE — 36415 COLL VENOUS BLD VENIPUNCTURE: CPT

## 2024-12-04 PROCEDURE — 80061 LIPID PANEL: CPT

## 2024-12-05 ENCOUNTER — MYC MEDICAL ADVICE (OUTPATIENT)
Dept: PEDIATRICS | Facility: CLINIC | Age: 64
End: 2024-12-05
Payer: COMMERCIAL

## 2024-12-05 DIAGNOSIS — E78.5 HYPERLIPIDEMIA LDL GOAL <70: Primary | ICD-10-CM

## 2024-12-08 RX ORDER — ATORVASTATIN CALCIUM 10 MG/1
10 TABLET, FILM COATED ORAL DAILY
Qty: 90 TABLET | Refills: 0 | Status: SHIPPED | OUTPATIENT
Start: 2024-12-08

## 2024-12-09 NOTE — TELEPHONE ENCOUNTER
The 10-year ASCVD risk score (Obi PALMA, et al., 2019) is: 9.5%    Values used to calculate the score:      Age: 63 years      Sex: Male      Is Non- : No      Diabetic: No      Tobacco smoker: No      Systolic Blood Pressure: 128 mmHg      Is BP treated: No      HDL Cholesterol: 74 mg/dL      Total Cholesterol: 181 mg/dL

## 2025-03-06 DIAGNOSIS — E78.5 HYPERLIPIDEMIA LDL GOAL <70: ICD-10-CM

## 2025-03-06 RX ORDER — ATORVASTATIN CALCIUM 10 MG/1
10 TABLET, FILM COATED ORAL DAILY
Qty: 90 TABLET | Refills: 0 | Status: SHIPPED | OUTPATIENT
Start: 2025-03-06

## 2025-03-12 ENCOUNTER — LAB (OUTPATIENT)
Dept: LAB | Facility: CLINIC | Age: 65
End: 2025-03-12
Payer: COMMERCIAL

## 2025-03-12 DIAGNOSIS — E78.5 HYPERLIPIDEMIA LDL GOAL <70: ICD-10-CM

## 2025-03-12 LAB
ALBUMIN SERPL BCG-MCNC: 4.3 G/DL (ref 3.5–5.2)
ALP SERPL-CCNC: 118 U/L (ref 40–150)
ALT SERPL W P-5'-P-CCNC: 28 U/L (ref 0–70)
AST SERPL W P-5'-P-CCNC: 31 U/L (ref 0–45)
BILIRUB DIRECT SERPL-MCNC: 0.19 MG/DL (ref 0–0.3)
BILIRUB SERPL-MCNC: 0.4 MG/DL
CHOLEST SERPL-MCNC: 140 MG/DL
FASTING STATUS PATIENT QL REPORTED: YES
HDLC SERPL-MCNC: 65 MG/DL
LDLC SERPL CALC-MCNC: 67 MG/DL
NONHDLC SERPL-MCNC: 75 MG/DL
PROT SERPL-MCNC: 6.8 G/DL (ref 6.4–8.3)
TRIGL SERPL-MCNC: 38 MG/DL

## 2025-03-12 PROCEDURE — 36415 COLL VENOUS BLD VENIPUNCTURE: CPT

## 2025-03-12 PROCEDURE — 80076 HEPATIC FUNCTION PANEL: CPT

## 2025-03-12 PROCEDURE — 80061 LIPID PANEL: CPT

## 2025-03-22 ENCOUNTER — HEALTH MAINTENANCE LETTER (OUTPATIENT)
Age: 65
End: 2025-03-22

## 2025-06-03 DIAGNOSIS — E78.5 HYPERLIPIDEMIA LDL GOAL <70: ICD-10-CM

## 2025-06-03 RX ORDER — ATORVASTATIN CALCIUM 10 MG/1
10 TABLET, FILM COATED ORAL DAILY
Qty: 30 TABLET | Refills: 0 | Status: SHIPPED | OUTPATIENT
Start: 2025-06-03

## 2025-06-27 PROBLEM — Z85.828 HISTORY OF SKIN CANCER: Status: ACTIVE | Noted: 2025-06-27

## 2025-06-28 ENCOUNTER — MYC MEDICAL ADVICE (OUTPATIENT)
Dept: PEDIATRICS | Facility: CLINIC | Age: 65
End: 2025-06-28
Payer: COMMERCIAL

## 2025-06-29 ENCOUNTER — RESULTS FOLLOW-UP (OUTPATIENT)
Dept: PEDIATRICS | Facility: CLINIC | Age: 65
End: 2025-06-29
Payer: COMMERCIAL

## 2025-06-29 DIAGNOSIS — E83.51 HYPOCALCEMIA: Primary | ICD-10-CM

## 2025-06-30 NOTE — TELEPHONE ENCOUNTER
"Please see patient MyChart message  -states discussed with PCP in the past about using flonase when Allegra doesn't do enough  -inquiring if should stop Allegra and switch to flonase or take together    Per chart review  OV AWV 6/27/25 with Carmen Lane   \"Consider trying flonase (fluticasone) inhalation when your allergies are bad. This is an inhaled nasal steroid. You it consistently when you use it. 1 spray both nostrils.\"    Carmen Lane please review and advise. Confirm should take concurrently?    Mari Santos RN   "

## 2025-07-02 ENCOUNTER — LAB (OUTPATIENT)
Dept: LAB | Facility: CLINIC | Age: 65
End: 2025-07-02
Payer: COMMERCIAL

## 2025-07-02 DIAGNOSIS — E83.51 HYPOCALCEMIA: ICD-10-CM

## 2025-07-02 PROCEDURE — 83970 ASSAY OF PARATHORMONE: CPT

## 2025-07-02 PROCEDURE — 36415 COLL VENOUS BLD VENIPUNCTURE: CPT

## 2025-07-03 ENCOUNTER — RESULTS FOLLOW-UP (OUTPATIENT)
Dept: PEDIATRICS | Facility: CLINIC | Age: 65
End: 2025-07-03
Payer: COMMERCIAL

## 2025-07-03 DIAGNOSIS — R79.89 HIGH SERUM PARATHYROID HORMONE (PTH): ICD-10-CM

## 2025-07-03 DIAGNOSIS — E83.51 HYPOCALCEMIA: Primary | ICD-10-CM

## 2025-07-03 LAB — PTH-INTACT SERPL-MCNC: 74 PG/ML (ref 15–65)

## 2025-07-07 ENCOUNTER — LAB (OUTPATIENT)
Dept: LAB | Facility: CLINIC | Age: 65
End: 2025-07-07
Payer: COMMERCIAL

## 2025-07-07 DIAGNOSIS — E83.51 HYPOCALCEMIA: ICD-10-CM

## 2025-07-07 DIAGNOSIS — R79.89 HIGH SERUM PARATHYROID HORMONE (PTH): ICD-10-CM

## 2025-07-07 PROCEDURE — 36415 COLL VENOUS BLD VENIPUNCTURE: CPT

## 2025-07-07 PROCEDURE — 82306 VITAMIN D 25 HYDROXY: CPT

## 2025-07-08 ENCOUNTER — RESULTS FOLLOW-UP (OUTPATIENT)
Dept: PEDIATRICS | Facility: CLINIC | Age: 65
End: 2025-07-08
Payer: COMMERCIAL

## 2025-07-08 DIAGNOSIS — E21.3 HYPERPARATHYROIDISM: ICD-10-CM

## 2025-07-08 DIAGNOSIS — E83.51 HYPOCALCEMIA: Primary | ICD-10-CM

## 2025-07-08 LAB — VIT D+METAB SERPL-MCNC: 24 NG/ML (ref 20–50)

## 2025-07-08 PROCEDURE — 99207 E-CONSULT TO ENDOCRINOLOGY (ADULT OUTPT PROVIDER TO SPECIALIST WRITTEN QUESTION & RESPONSE): CPT | Performed by: NURSE PRACTITIONER

## 2025-07-10 ENCOUNTER — E-CONSULT (OUTPATIENT)
Dept: ENDOCRINOLOGY | Facility: CLINIC | Age: 65
End: 2025-07-10
Payer: COMMERCIAL

## 2025-07-11 NOTE — PROGRESS NOTES
7/10/2025     E-Consult has been accepted     Interprofessional consultation requested by:  Carmen Lane APRN CNP      Clinical Question/Purpose: MY CLINICAL QUESTION IS: This patient was incidentally found to have hypocalcemia on routine labs. With a normal albumin level, an ionized calcium was not checked. I had him back in for a PTH and vitamin D. PTH was slightly high and vitamin D was within expected range (although on the low end of normal). Wondering what you would suggest for additional work-up. I considered diagnosis of pseudohypoparathyroidism and have not checked a phosphate or magnesium yet. He does not have CKD, no recent surgery or illness, no s/sx of acute pancreatitis.Thank you in advance for your recommendations.     Patient assessment and information reviewed:   No past medical history on file.    5/24/24 Ca 8.6  6/27/25 Ca 8.2, creatinine 1.12  7/2/25 PTH 74, 25OH D24    Current Outpatient Medications   Medication Sig Dispense Refill    atorvastatin (LIPITOR) 10 MG tablet Take 1 tablet (10 mg) by mouth daily. 90 tablet 3    fexofenadine (ALLEGRA) 180 MG tablet Take 180 mg by mouth daily as needed for allergies       High PTH with low calcium usually indicates secondary hyperparathyroidism.  This is usually due to either inadequate vitamin D or calcium or both .     Recommendations:   Add on phosphorus and alk phos to the 7/7 samples so you have baseline.  Add vitamin D  1000 international unit(s)/day  Repeat labs in 4 months including PTH, calcium, phosphorus .  If not normal then, get 24 hour urine calcium and creatinine.     The recommendations provided in this E-Consult are based on a review of clinical data pertinent to the clinical question presented, without a review of the patient's complete medical record or, the benefit of a comprehensive in-person or virtual patient evaluation. This consultation should not replace the clinical judgement and evaluation of the provider ordering  this E-Consult. Any new clinical issues, or changes in patient status since the filing of this E-Consult will need to be taken into account when assessing these recommendations. Please contact me if you have further questions.    My total time spent reviewing clinical information and formulating assessment was 5 minutes.        Kaila Al MD

## 2025-07-14 ENCOUNTER — LAB (OUTPATIENT)
Dept: LAB | Facility: CLINIC | Age: 65
End: 2025-07-14
Payer: COMMERCIAL

## 2025-07-14 DIAGNOSIS — E83.51 HYPOCALCEMIA: ICD-10-CM

## 2025-07-14 DIAGNOSIS — R79.89 HIGH SERUM PARATHYROID HORMONE (PTH): ICD-10-CM

## 2025-07-14 DIAGNOSIS — E21.3 HYPERPARATHYROIDISM: ICD-10-CM

## 2025-07-14 LAB
ALP SERPL-CCNC: 105 U/L (ref 40–150)
PHOSPHATE SERPL-MCNC: 2.4 MG/DL (ref 2.5–4.5)

## 2025-07-14 PROCEDURE — 84075 ASSAY ALKALINE PHOSPHATASE: CPT

## 2025-07-14 PROCEDURE — 36415 COLL VENOUS BLD VENIPUNCTURE: CPT

## 2025-07-14 PROCEDURE — 84100 ASSAY OF PHOSPHORUS: CPT

## 2025-07-16 ENCOUNTER — RESULTS FOLLOW-UP (OUTPATIENT)
Dept: PEDIATRICS | Facility: CLINIC | Age: 65
End: 2025-07-16
Payer: COMMERCIAL

## (undated) DEVICE — SU VICRYL 0 UR-6 27" J603H

## (undated) DEVICE — SOL WATER IRRIG 1000ML BOTTLE 2F7114

## (undated) DEVICE — LINEN ORTHO ACL PACK 5447

## (undated) DEVICE — PREP CHLORAPREP 26ML TINTED ORANGE  260815

## (undated) DEVICE — SPONGE COTTONOID 1/2X1/2" 80-1400

## (undated) DEVICE — PACK SMALL SPINE RIDGES

## (undated) DEVICE — ESU GROUND PAD ADULT W/CORD E7507

## (undated) DEVICE — DRAPE MAYO STAND 23X54 8337

## (undated) DEVICE — ESU ELEC BLADE 6" COATED/INSULATED E1455-6

## (undated) DEVICE — SYR 30ML LL W/O NDL 302832

## (undated) DEVICE — ADH SKIN CLOSURE PREMIERPRO EXOFIN 1.0ML 3470

## (undated) DEVICE — GLOVE PROTEXIS MICRO 7.5  2D73PM75

## (undated) DEVICE — GLOVE PROTEXIS BLUE W/NEU-THERA 8.0  2D73EB80

## (undated) DEVICE — MIDAS REX DISSECTING TOOL 5.5MM T14MH25

## (undated) DEVICE — DRAPE MICROSCOPE OPMI ZEISS 48X118" 306071-0000-000

## (undated) DEVICE — SUCTION MANIFOLD NEPTUNE 2 SYS 4 PORT 0702-020-000

## (undated) DEVICE — SU MONOCRYL 4-0 PS-2 27" UND Y426H

## (undated) DEVICE — SPONGE SURGIFOAM 100 1974

## (undated) DEVICE — GLOVE PROTEXIS W/NEU-THERA 8.0  2D73TE80

## (undated) DEVICE — DRAPE STERI TOWEL LG 1010

## (undated) DEVICE — SU ETHILON 3-0 PS-1 18" 1663H

## (undated) DEVICE — NDL BLUNT 18GA 1" W/O FILTER 305181

## (undated) DEVICE — SOL NACL 0.9% IRRIG 1000ML BOTTLE 2F7124

## (undated) DEVICE — DRAPE COVER C-ARM SEAMLESS SNAP-KAP 03-KP26 LATEX FREE

## (undated) DEVICE — ENDO SNARE POLYPECTOMY OVAL 15MM LOOP SD-240U-15

## (undated) DEVICE — SU VICRYL 2-0 CT-2 CR 8X18" J726D

## (undated) DEVICE — NDL ANGIOCATH 14GA 1.25" 3068

## (undated) DEVICE — GLOVE PROTEXIS BLUE W/NEU-THERA 8.5  2D73EB85

## (undated) RX ORDER — PROPOFOL 10 MG/ML
INJECTION, EMULSION INTRAVENOUS
Status: DISPENSED
Start: 2020-05-13

## (undated) RX ORDER — LIDOCAINE HYDROCHLORIDE 10 MG/ML
INJECTION, SOLUTION EPIDURAL; INFILTRATION; INTRACAUDAL; PERINEURAL
Status: DISPENSED
Start: 2020-05-13

## (undated) RX ORDER — BUPIVACAINE HYDROCHLORIDE AND EPINEPHRINE 2.5; 5 MG/ML; UG/ML
INJECTION, SOLUTION EPIDURAL; INFILTRATION; INTRACAUDAL; PERINEURAL
Status: DISPENSED
Start: 2020-05-13

## (undated) RX ORDER — PHENYLEPHRINE HCL IN 0.9% NACL 1 MG/10 ML
SYRINGE (ML) INTRAVENOUS
Status: DISPENSED
Start: 2020-05-13

## (undated) RX ORDER — EPHEDRINE SULFATE 50 MG/ML
INJECTION, SOLUTION INTRAMUSCULAR; INTRAVENOUS; SUBCUTANEOUS
Status: DISPENSED
Start: 2020-05-13

## (undated) RX ORDER — GLYCOPYRROLATE 0.2 MG/ML
INJECTION INTRAMUSCULAR; INTRAVENOUS
Status: DISPENSED
Start: 2020-05-13

## (undated) RX ORDER — FENTANYL CITRATE 50 UG/ML
INJECTION, SOLUTION INTRAMUSCULAR; INTRAVENOUS
Status: DISPENSED
Start: 2020-03-16

## (undated) RX ORDER — CELECOXIB 200 MG/1
CAPSULE ORAL
Status: DISPENSED
Start: 2020-05-13

## (undated) RX ORDER — SIMETHICONE 40MG/0.6ML
SUSPENSION, DROPS(FINAL DOSAGE FORM)(ML) ORAL
Status: DISPENSED
Start: 2020-03-16

## (undated) RX ORDER — FENTANYL CITRATE 50 UG/ML
INJECTION, SOLUTION INTRAMUSCULAR; INTRAVENOUS
Status: DISPENSED
Start: 2024-05-10

## (undated) RX ORDER — ACETAMINOPHEN 325 MG/1
TABLET ORAL
Status: DISPENSED
Start: 2020-05-13

## (undated) RX ORDER — NEOSTIGMINE METHYLSULFATE 1 MG/ML
VIAL (ML) INJECTION
Status: DISPENSED
Start: 2020-05-13

## (undated) RX ORDER — DEXAMETHASONE SODIUM PHOSPHATE 4 MG/ML
INJECTION, SOLUTION INTRA-ARTICULAR; INTRALESIONAL; INTRAMUSCULAR; INTRAVENOUS; SOFT TISSUE
Status: DISPENSED
Start: 2020-05-13

## (undated) RX ORDER — ONDANSETRON 2 MG/ML
INJECTION INTRAMUSCULAR; INTRAVENOUS
Status: DISPENSED
Start: 2020-05-13

## (undated) RX ORDER — GABAPENTIN 300 MG/1
CAPSULE ORAL
Status: DISPENSED
Start: 2020-05-13

## (undated) RX ORDER — FENTANYL CITRATE 50 UG/ML
INJECTION, SOLUTION INTRAMUSCULAR; INTRAVENOUS
Status: DISPENSED
Start: 2020-05-13

## (undated) RX ORDER — CEFAZOLIN SODIUM 2 G/100ML
INJECTION, SOLUTION INTRAVENOUS
Status: DISPENSED
Start: 2020-05-13